# Patient Record
Sex: MALE | Race: WHITE | NOT HISPANIC OR LATINO | Employment: OTHER | URBAN - METROPOLITAN AREA
[De-identification: names, ages, dates, MRNs, and addresses within clinical notes are randomized per-mention and may not be internally consistent; named-entity substitution may affect disease eponyms.]

---

## 2018-02-18 ENCOUNTER — APPOINTMENT (OUTPATIENT)
Dept: CT IMAGING | Facility: CLINIC | Age: 63
End: 2018-02-18
Attending: PHYSICIAN ASSISTANT
Payer: COMMERCIAL

## 2018-02-18 ENCOUNTER — HOSPITAL ENCOUNTER (EMERGENCY)
Facility: CLINIC | Age: 63
Discharge: HOME | End: 2018-02-18
Attending: PHYSICIAN ASSISTANT | Admitting: PHYSICIAN ASSISTANT
Payer: COMMERCIAL

## 2018-02-18 VITALS
DIASTOLIC BLOOD PRESSURE: 89 MMHG | TEMPERATURE: 99.7 F | OXYGEN SATURATION: 97 % | SYSTOLIC BLOOD PRESSURE: 159 MMHG | RESPIRATION RATE: 16 BRPM

## 2018-02-18 DIAGNOSIS — K11.21 PAROTITIS, ACUTE: ICD-10-CM

## 2018-02-18 PROCEDURE — 25000128 H RX IP 250 OP 636: Performed by: PHYSICIAN ASSISTANT

## 2018-02-18 PROCEDURE — 25000125 ZZHC RX 250: Performed by: PHYSICIAN ASSISTANT

## 2018-02-18 PROCEDURE — 70491 CT SOFT TISSUE NECK W/DYE: CPT

## 2018-02-18 PROCEDURE — G0463 HOSPITAL OUTPT CLINIC VISIT: HCPCS | Mod: 25

## 2018-02-18 PROCEDURE — 99214 OFFICE O/P EST MOD 30 MIN: CPT | Performed by: PHYSICIAN ASSISTANT

## 2018-02-18 RX ORDER — IOPAMIDOL 755 MG/ML
80 INJECTION, SOLUTION INTRAVASCULAR ONCE
Status: COMPLETED | OUTPATIENT
Start: 2018-02-18 | End: 2018-02-18

## 2018-02-18 RX ADMIN — IOPAMIDOL 80 ML: 755 INJECTION, SOLUTION INTRAVENOUS at 14:18

## 2018-02-18 RX ADMIN — SODIUM CHLORIDE 80 ML: 9 INJECTION, SOLUTION INTRAVENOUS at 14:18

## 2018-02-18 NOTE — ED AVS SNAPSHOT
" [facility] Emergency Department    [address] [place] [address]    Phone:  [phone]    Fax:  [phone]                                       Fadi Fernando   MRN: [id number]    Department:  [facility] Emergency Department   Date of Visit:  2/18/2018           Patient Information     Date Of Birth          1955        Your diagnoses for this visit were:     Parotitis, acute        You were seen by Iman Manzo PA-C.      Follow-up Information     Follow up with Sp Gudino DO In 3 days.    Specialty:  Family Practice    Why:  if no improvement or sooner if new or worsening symptoms     Contact information:    [facility]  [address] [address]  [phone]          Discharge Instructions         Salivary Gland Infection  Salivary glands make saliva. Saliva is mostly water. It also has minerals and proteins that help break down food and keep the mouth and teeth healthy. There are three pairs of salivary glands:    Parotid glands (in front of the ear)    Submandibular glands (below the jaw)    Sublingual glands (below the tongue)  A salivary gland can become infected by bacteria (germs). Things that make this more likely include dehydration and taking medicines that affect saliva flow. Infection is also more likely when the duct (channel) that carries saliva from the gland to the mouth is blocked. It may be blocked by a salivary gland \"stone.\" This is a collection of minerals that forms in the salivary gland.  Signs of infection include fever, severe pain in the gland, and redness and swelling over the gland. It may hurt to open the mouth. Symptoms may be worse when the flow of saliva is stimulated, such as by the smell of food.   Antibiotics are used to treat the infection. Drainage of the infection with a simple surgical procedure is sometimes needed. It a salivary gland stone is present, a procedure may be done to remove " it.  Home Care:    Take antibiotics as directed until they are finished. Do this even if you start to feel better after only a few days.    Unless another medicine was prescribed, take over-the-counter medicines, such as acetaminophen or ibuprofen, to help relieve pain.    Moist heat can also help relieve swelling and pain. Wet a cloth with warm water and put it over the affected gland for 10-15 minutes several times a day.    Gently massage the gland a few times a day.     Suck on lemon or other tart hard candies to encourage flow of saliva.  To help prevent blockages and infections:    Drink 6-8 glasses of fluid per day (such as water, tea, and clear soup) to keep well hydrated.    If you smoke, ask your healthcare provider for help to quit. Smoking makes salivary gland stones more likely.    Maintain good dental hygiene. Brush and floss your teeth daily. See your dentist for regular cleanings.  Follow-up care  Follow up with your healthcare provider or as advised.   When to seek medical advice  Call your healthcare provider if any of the following occur:    Fever over 100.4 F (38 C) after two days of taking antibiotics    Symptoms get worse or don't improve within a few days    Trouble breathing or swallowing  Date Last Reviewed: 5/4/2015 2000-2017 [employer]. [address] [place] [address], [place] [address]. All rights reserved. This information is not intended as a substitute for professional medical care. Always follow your healthcare professional's instructions.          24 Hour Appointment Hotline       To make an appointment at any [facility] clinic, call [phone] ([phone]). If you don't have a family doctor or clinic, we will help you find one. [facility] clinics are conveniently located to serve the needs of you and your family.             Review of your medicines      START taking        Dose / Directions Last dose taken    amoxicillin-clavulanate 875-125 MG per tablet   Commonly  known as:  AUGMENTIN   Dose:  1 tablet   Quantity:  20 tablet        Take 1 tablet by mouth 2 times daily for 10 days   Refills:  0          Our records show that you are taking the medicines listed below. If these are incorrect, please call your family doctor or clinic.        Dose / Directions Last dose taken    aspirin 325 MG EC tablet        1 TABLET ORALLY DAILY   Refills:  0        CHLORTHALIDONE PO   Dose:  25 mg        Take 25 mg by mouth daily   Refills:  0        IMDUR PO   Dose:  30 mg        Take 30 mg by mouth daily   Refills:  0        LIPITOR PO   Dose:  40 mg        Take 40 mg by mouth daily   Refills:  0        LOSARTAN POTASSIUM PO   Dose:  50 mg   Indication:  High Blood Pressure Disorder        Take 50 mg by mouth 2 times daily   Refills:  0        METOPROLOL TARTRATE PO   Dose:  100 mg   Indication:  High Blood Pressure Disorder        Take 100 mg by mouth 2 times daily   Refills:  0        NORVASC PO   Dose:  5 mg        Take 5 mg by mouth 2 times daily   Refills:  0        OMEPRAZOLE PO   Dose:  20 mg        Take 20 mg by mouth daily   Refills:  0        PLAVIX PO   Dose:  75 mg        Take 75 mg by mouth daily   Refills:  0                Prescriptions were sent or printed at these locations (1 Prescription)                   [facility] [address] AT [facility] [address] [facility]   [address] [facility] [address]    Telephone:  [phone]   Fax:  [phone]   Hours:                  E-Prescribed (1 of 1)         amoxicillin-clavulanate (AUGMENTIN) 875-125 MG per tablet                Procedures and tests performed during your visit     Soft tissue neck CT w contrast      Orders Needing Specimen Collection     None      Pending Results     No orders found from 2/16/2018 to 2/19/2018.            Pending Culture Results     No orders found from 2/16/2018 to 2/19/2018.            Pending Results Instructions     If you had any lab results  that were not finalized at the time of your Discharge, you can call the ED Lab Result RN at [phone]. You will be contacted by this team for any positive Lab results or changes in treatment. The nurses are available 7 days a week from 10A to 6:30P.  You can leave a message 24 hours per day and they will return your call.        Test Results From Your Hospital Stay        2/18/2018  2:50 PM      Narrative     CT SCAN OF THE NECK WITH CONTRAST  2/18/2018 2:32 PM     HISTORY: Left-sided neck swelling, denies fevers, increased pain left  side, marked area of swelling with vitamin E.     TECHNIQUE: Axial images and coronal reformations. Radiation dose for  this scan was reduced using automated exposure control, adjustment of  the mA and/or kV according to patient size, or iterative  reconstruction technique. 80 mL isovue 370 IV.    COMPARISON: None.    FINDINGS:  Visualized sinuses, nasopharynx and orbits: Normal.      Tongue, oral cavity and oropharynx:  Normal.      Hypopharynx: Normal.      Larynx and trachea: Normal.      Thyroid: Normal.    Submandibular glands: Normal.      Parotid glands: There is swelling of the left parotid gland  superficial lobe especially inferiorly with adjacent strand-like  density consistent with peritonitis. This is best seen on the coronal  images, with the left gland having higher attenuation than the right.  Normal right parotid gland. No evidence of abscess. The tail of the  left parotid gland corresponds with the vitamin E capsule marker.  There is thickening of the adjacent fascia, an additional sign of  inflammation.      Lymph nodes: Normal.      Vasculature: Normal.      Lungs and upper mediastinum: Normal.      Bones: Mild torus mandibularis and torus maxillaris, normal variants.  Fluid in a few of the inferior left mastoid air cells.        Impression     IMPRESSION: Left parotitis involving the superficial lobe of the left  parotid gland and more the tail of the gland than  "superiorly. No  evidence of abscess.    ARCELIA JETER MD                Thank you for choosing [facility]       Thank you for choosing [facility] for your care. Our goal is always to provide you with excellent care. Hearing back from our patients is one way we can continue to improve our services. Please take a few minutes to complete the written survey that you may receive in the mail after you visit with us. Thank you!        [facility] Information     [facility] lets you send messages to your doctor, view your test results, renew your prescriptions, schedule appointments and more. To sign up, go to [email or web address] . Click on \"Log in\" on the left side of the screen, which will take you to the Welcome page. Then click on \"Sign up Now\" on the right side of the page.     You will be asked to enter the access code listed below, as well as some personal information. Please follow the directions to create your username and password.     Your access code is: [email or web address]  Expires: 2018  3:00 PM     Your access code will  in 90 days. If you need help or a new code, please call your [facility] clinic or [phone].        Care EveryWhere ID     This is your Care EveryWhere ID. This could be used by other organizations to access your [facility] medical records  [id number]        Equal Access to Services     ROMELIA WANG : Hadii saroj Roberts, waaxda luqadaha, qaybta kaalmada adeegyada, светлана hdz . So [facility] [phone].    ATENCIÓN: Si habla español, tiene a klein disposición servicios gratuitos de asistencia lingüística. Llame al [phone].    We comply with applicable federal civil rights laws and [place] laws. We do not discriminate on the basis of race, color, national origin, age, disability, sex, sexual orientation, or gender identity.            After Visit Summary       This is your record. Keep this with you and show to your community pharmacist(s) and doctor(s) " at your next visit.

## 2018-02-18 NOTE — DISCHARGE INSTRUCTIONS
"  Salivary Gland Infection  Salivary glands make saliva. Saliva is mostly water. It also has minerals and proteins that help break down food and keep the mouth and teeth healthy. There are three pairs of salivary glands:    Parotid glands (in front of the ear)    Submandibular glands (below the jaw)    Sublingual glands (below the tongue)  A salivary gland can become infected by bacteria (germs). Things that make this more likely include dehydration and taking medicines that affect saliva flow. Infection is also more likely when the duct (channel) that carries saliva from the gland to the mouth is blocked. It may be blocked by a salivary gland \"stone.\" This is a collection of minerals that forms in the salivary gland.  Signs of infection include fever, severe pain in the gland, and redness and swelling over the gland. It may hurt to open the mouth. Symptoms may be worse when the flow of saliva is stimulated, such as by the smell of food.   Antibiotics are used to treat the infection. Drainage of the infection with a simple surgical procedure is sometimes needed. It a salivary gland stone is present, a procedure may be done to remove it.  Home Care:    Take antibiotics as directed until they are finished. Do this even if you start to feel better after only a few days.    Unless another medicine was prescribed, take over-the-counter medicines, such as acetaminophen or ibuprofen, to help relieve pain.    Moist heat can also help relieve swelling and pain. Wet a cloth with warm water and put it over the affected gland for 10-15 minutes several times a day.    Gently massage the gland a few times a day.     Suck on lemon or other tart hard candies to encourage flow of saliva.  To help prevent blockages and infections:    Drink 6-8 glasses of fluid per day (such as water, tea, and clear soup) to keep well hydrated.    If you smoke, ask your healthcare provider for help to quit. Smoking makes salivary gland stones more " likely.    Maintain good dental hygiene. Brush and floss your teeth daily. See your dentist for regular cleanings.  Follow-up care  Follow up with your healthcare provider or as advised.   When to seek medical advice  Call your healthcare provider if any of the following occur:    Fever over 100.4 F (38 C) after two days of taking antibiotics    Symptoms get worse or don't improve within a few days    Trouble breathing or swallowing  Date Last Reviewed: 5/4/2015 2000-2017 [employer]. [address] [place] [address]. All rights reserved. This information is not intended as a substitute for professional medical care. Always follow your healthcare professional's instructions.

## 2018-02-18 NOTE — ED AVS SNAPSHOT
[facility] Emergency Department    [address] [address]    Phone:  [phone]    Fax:  [phone]                                       Fadi Fernando   MRN: [id number]    Department:  [facility] Emergency Department   Date of Visit:  2/18/2018           After Visit Summary Signature Page     I have received my discharge instructions, and my questions have been answered. I have discussed any challenges I see with this plan with the nurse or doctor.    ..........................................................................................................................................  Patient/Patient Representative Signature      ..........................................................................................................................................  Patient Representative Print Name and Relationship to Patient    ..................................................               ................................................  Date                                            Time    ..........................................................................................................................................  Reviewed by Signature/Title    ...................................................              ..............................................  Date                                                            Time

## 2018-02-27 NOTE — ED PROVIDER NOTES
History     Chief Complaint   Patient presents with     Facial Swelling     Pt started having swelling in L jaw.  Has a bad L lower molar, broken, feels like swelling is from that.      HPI  Fadi Fernando is a 63 year old male who presents to the urgent care with concern over left-sided facial/neck swelling which is been present for the last 24 hours.  Patient states he has an ongoing history of known dental decay and believes that swelling may be related to his tooth.  He denies any new onset dental pain at this time however.  He has not had any fever, chills, myalgias, nasal congestion, sore throat, cough, dyspnea, wheezing.  His pain does not wax and wane with eating or drinking.    Problem List:    There are no active problems to display for this patient.       Past Medical History:    Past Medical History:   Diagnosis Date     Hypertension        Past Surgical History:    Past Surgical History:   Procedure Laterality Date     APPENDECTOMY       HERNIA REPAIR         Family History:    Family History   Problem Relation Age of Onset     MENTAL ILLNESS Mother      MENTAL ILLNESS Maternal Grandmother      Substance Abuse Son      Suicide Son      Substance Abuse Daughter      Suicide Daughter      Substance Abuse Other        Social History:  Marital Status:   [4]  Social History   Substance Use Topics     Smoking status: Former Smoker     Smokeless tobacco: Not on file     Alcohol use Yes      Comment: Sober since 1/28/16.      Medications:      amoxicillin-clavulanate (AUGMENTIN) 875-125 MG per tablet   CHLORTHALIDONE PO   Isosorbide Mononitrate (IMDUR PO)   AmLODIPine Besylate (NORVASC PO)   Clopidogrel Bisulfate (PLAVIX PO)   Atorvastatin Calcium (LIPITOR PO)   LOSARTAN POTASSIUM PO   METOPROLOL TARTRATE PO   OMEPRAZOLE PO   ASPIRIN 325 MG OR TBEC     Review of Systems  CONSTITUTIONAL:NEGATIVE for fever, chills, change in weight  INTEGUMENTARY/SKIN: NEGATIVE for worrisome rashes, moles or  lesions  EYES: NEGATIVE for vision changes or irritation  ENT/MOUTH: POSITIVE for left sided facial/jaw swelling and NEGATIVE for nasal congestion, sore throat, ear pain  RESP:NEGATIVE for significant cough or SOB  GI: NEGATIVE for abdominal pain, diarrhea, nausea and vomiting  Physical Exam   BP: 159/89  Heart Rate: 77  Temp: 99.7  F (37.6  C)  Resp: 16  SpO2: 97 %  Physical Exam   Constitutional: He appears well-developed and well-nourished. No distress.   HENT:   Head: Normocephalic and atraumatic.       Fractured tooth #21 without focal tenderness.   Neck: Normal range of motion. Neck supple.   Cardiovascular: Normal rate, regular rhythm and normal heart sounds.  Exam reveals no gallop and no friction rub.    No murmur heard.  Pulmonary/Chest: Effort normal and breath sounds normal. No respiratory distress. He has no wheezes. He has no rales.   Skin: Skin is warm and dry. No rash noted. No erythema.     ED Course     ED Course     Procedures        Critical Care time:  none            Labs Ordered and Resulted from Time of ED Arrival Up to the Time of Departure from the ED - No data to display    Results for orders placed or performed during the hospital encounter of 02/18/18   Soft tissue neck CT w contrast    Narrative    CT SCAN OF THE NECK WITH CONTRAST  2/18/2018 2:32 PM     HISTORY: Left-sided neck swelling, denies fevers, increased pain left  side, marked area of swelling with vitamin E.     TECHNIQUE: Axial images and coronal reformations. Radiation dose for  this scan was reduced using automated exposure control, adjustment of  the mA and/or kV according to patient size, or iterative  reconstruction technique. 80 mL isovue 370 IV.    COMPARISON: None.    FINDINGS:  Visualized sinuses, nasopharynx and orbits: Normal.      Tongue, oral cavity and oropharynx:  Normal.      Hypopharynx: Normal.      Larynx and trachea: Normal.      Thyroid: Normal.    Submandibular glands: Normal.      Parotid glands: There  is swelling of the left parotid gland  superficial lobe especially inferiorly with adjacent strand-like  density consistent with peritonitis. This is best seen on the coronal  images, with the left gland having higher attenuation than the right.  Normal right parotid gland. No evidence of abscess. The tail of the  left parotid gland corresponds with the vitamin E capsule marker.  There is thickening of the adjacent fascia, an additional sign of  inflammation.      Lymph nodes: Normal.      Vasculature: Normal.      Lungs and upper mediastinum: Normal.      Bones: Mild torus mandibularis and torus maxillaris, normal variants.  Fluid in a few of the inferior left mastoid air cells.      Impression    IMPRESSION: Left parotitis involving the superficial lobe of the left  parotid gland and more the tail of the gland than superiorly. No  evidence of abscess.    ARCELIA JETER MD     Assessments & Plan (with Medical Decision Making)     I have reviewed the nursing notes.    I have reviewed the findings, diagnosis, plan and need for follow up with the patient.       Discharge Medication List as of 2/18/2018  3:00 PM      START taking these medications    Details   amoxicillin-clavulanate (AUGMENTIN) 875-125 MG per tablet Take 1 tablet by mouth 2 times daily for 10 days, Disp-20 tablet, R-0, E-Prescribe           Final diagnoses:   Parotitis, acute     22-year-old male presents to urgent care with concern over 24 history of left-sided facial/jaw swelling.  He was noted to have hypertension on arrival, remainder vital signs within normal limits.  Physical exam findings as described above.  Better elucidate.  Swelling did elect to do the CT scan which confirmed left parotitis with abscess.  Patient was discharged in stable prescription for Augmentin.  Follow-up with primary care provider if no improvement within the next 3-5 days.  Worrisome reasons to return to the ER/UC sooner discussed.    Disclaimer: This note consists of  symbols derived from keyboarding, dictation, and/or voice recognition software. As a result, there may be errors in the script that have gone undetected.  Please consider this when interpreting information found in the chart.      2/18/2018   [facility] EMERGENCY DEPARTMENT     Iman Manzo PA-C  02/26/18 2101

## 2019-02-07 ENCOUNTER — HOSPITAL ENCOUNTER (EMERGENCY)
Facility: CLINIC | Age: 64
Discharge: HOME | End: 2019-02-07
Attending: NURSE PRACTITIONER | Admitting: NURSE PRACTITIONER
Payer: COMMERCIAL

## 2019-02-07 ENCOUNTER — APPOINTMENT (OUTPATIENT)
Dept: GENERAL RADIOLOGY | Facility: CLINIC | Age: 64
End: 2019-02-07
Attending: NURSE PRACTITIONER
Payer: COMMERCIAL

## 2019-02-07 VITALS
RESPIRATION RATE: 18 BRPM | OXYGEN SATURATION: 95 % | DIASTOLIC BLOOD PRESSURE: 90 MMHG | BODY MASS INDEX: 33.75 KG/M2 | SYSTOLIC BLOOD PRESSURE: 176 MMHG | HEART RATE: 103 BPM | TEMPERATURE: 98 F | HEIGHT: 66 IN | WEIGHT: 210 LBS

## 2019-02-07 DIAGNOSIS — R06.02 SHORTNESS OF BREATH: ICD-10-CM

## 2019-02-07 DIAGNOSIS — I25.10 CORONARY ARTERY DISEASE: ICD-10-CM

## 2019-02-07 DIAGNOSIS — R06.2 BILATERAL WHEEZING: ICD-10-CM

## 2019-02-07 DIAGNOSIS — R07.89 CHEST WALL PAIN: ICD-10-CM

## 2019-02-07 PROBLEM — C44.91 BASAL CELL CARCINOMA OF SKIN: Status: ACTIVE | Noted: 2017-06-13

## 2019-02-07 PROBLEM — K21.9 GERD (GASTROESOPHAGEAL REFLUX DISEASE): Status: ACTIVE | Noted: 2019-02-07

## 2019-02-07 PROCEDURE — 99213 OFFICE O/P EST LOW 20 MIN: CPT | Mod: Z6 | Performed by: NURSE PRACTITIONER

## 2019-02-07 PROCEDURE — 94640 AIRWAY INHALATION TREATMENT: CPT

## 2019-02-07 PROCEDURE — G0463 HOSPITAL OUTPT CLINIC VISIT: HCPCS | Mod: 25

## 2019-02-07 PROCEDURE — 25000125 ZZHC RX 250: Performed by: NURSE PRACTITIONER

## 2019-02-07 PROCEDURE — 71101 X-RAY EXAM UNILAT RIBS/CHEST: CPT | Mod: RT

## 2019-02-07 RX ORDER — INHALER, ASSIST DEVICES
1 SPACER (EA) MISCELLANEOUS ONCE
Status: DISCONTINUED | OUTPATIENT
Start: 2019-02-07 | End: 2019-02-07 | Stop reason: CLARIF

## 2019-02-07 RX ORDER — AMLODIPINE BESYLATE 10 MG/1
10 TABLET ORAL
COMMUNITY
Start: 2018-11-06 | End: 2021-09-27

## 2019-02-07 RX ORDER — METOPROLOL SUCCINATE 100 MG/1
100 TABLET, EXTENDED RELEASE ORAL DAILY
COMMUNITY
Start: 2021-09-17 | End: 2022-01-01

## 2019-02-07 RX ORDER — ISOSORBIDE MONONITRATE 30 MG/1
30 TABLET, EXTENDED RELEASE ORAL
COMMUNITY
Start: 2018-11-06 | End: 2021-09-27

## 2019-02-07 RX ORDER — LOSARTAN POTASSIUM 100 MG/1
100 TABLET ORAL
COMMUNITY
Start: 2018-11-06 | End: 2021-09-27

## 2019-02-07 RX ORDER — CHLORTHALIDONE 25 MG/1
25 TABLET ORAL
COMMUNITY
Start: 2018-11-06 | End: 2021-09-27

## 2019-02-07 RX ORDER — LIDOCAINE 50 MG/G
2 PATCH TOPICAL EVERY 24 HOURS
Qty: 60 PATCH | Refills: 0 | Status: SHIPPED | OUTPATIENT
Start: 2019-02-07 | End: 2019-03-09

## 2019-02-07 RX ORDER — IPRATROPIUM BROMIDE AND ALBUTEROL SULFATE 2.5; .5 MG/3ML; MG/3ML
3 SOLUTION RESPIRATORY (INHALATION) ONCE
Status: COMPLETED | OUTPATIENT
Start: 2019-02-07 | End: 2019-02-07

## 2019-02-07 RX ORDER — MELOXICAM 15 MG/1
15 TABLET ORAL DAILY
Qty: 15 TABLET | Refills: 0 | Status: SHIPPED | OUTPATIENT
Start: 2019-02-07 | End: 2021-09-27

## 2019-02-07 RX ORDER — NITROGLYCERIN 0.4 MG/1
0.4 TABLET SUBLINGUAL EVERY 5 MIN PRN
COMMUNITY
Start: 2018-04-25 | End: 2022-01-01

## 2019-02-07 RX ADMIN — IPRATROPIUM BROMIDE AND ALBUTEROL SULFATE 3 ML: .5; 3 SOLUTION RESPIRATORY (INHALATION) at 16:10

## 2019-02-07 ASSESSMENT — ENCOUNTER SYMPTOMS
DIAPHORESIS: 0
SHORTNESS OF BREATH: 1
SORE THROAT: 0
COUGH: 1
SINUS PAIN: 0
SPEECH DIFFICULTY: 0
DIARRHEA: 0
FEVER: 0
DYSURIA: 0
NUMBNESS: 0
CHILLS: 0
WHEEZING: 0
EYE PAIN: 0
DIFFICULTY URINATING: 0
CONFUSION: 0
ABDOMINAL PAIN: 0
VOMITING: 0
CONSTIPATION: 0
WEAKNESS: 0
WOUND: 0
NAUSEA: 0

## 2019-02-07 ASSESSMENT — MIFFLIN-ST. JEOR: SCORE: 1695.3

## 2019-02-07 NOTE — ED AVS SNAPSHOT
[facility] Emergency Department  [address] [facility] [address] [address]  Phone:  [phone]  Fax:  [phone]                                    Fadi Fernando   MRN: [id number]    Department:  [facility] Emergency Department   Date of Visit:  2/7/2019           After Visit Summary Signature Page    I have received my discharge instructions, and my questions have been answered. I have discussed any challenges I see with this plan with the nurse or doctor.    ..........................................................................................................................................  Patient/Patient Representative Signature      ..........................................................................................................................................  Patient Representative Print Name and Relationship to Patient    ..................................................               ................................................  Date                                   Time    ..........................................................................................................................................  Reviewed by Signature/Title    ...................................................              ..............................................  Date                                               Time          22EPIC Rev 08/18

## 2019-02-07 NOTE — ED PROVIDER NOTES
History     Chief Complaint   Patient presents with     Rib Pain     R sided rib pain after coughing     HPI  Fadi Fernando is a 64 year old male who presents with right sided rib pain.  Pt states he was coughing today and felt something pop and reports immediate shooting pain in right side of abdomen/lower rib area.   Pt reports pain as sharp and throbbing pain without a cough but movement or a cough elicits a sharp pain.  Pt reports pain level of 05-9/10.Pt admits to chronic shortness of breath.  PT states hx of chronic cough and admits to smoking cessation 10 years ago.  Pt denies hx of COPD.      Allergies:  Allergies   Allergen Reactions     Lisinopril      Motrin [Ibuprofen Micronized] GI Disturbance     Niacin      Problem List:    Patient Active Problem List    Diagnosis Date Noted     GERD (gastroesophageal reflux disease) 02/07/2019     Priority: Medium     Coronary artery disease      Priority: Medium     Basal cell carcinoma of skin 06/13/2017     Priority: Medium     Tourette's disorder 11/30/2016     Priority: Medium     Depression 09/21/2016     Priority: Medium     Alcohol abuse 01/31/2015     Priority: Medium        Past Medical History:    Past Medical History:   Diagnosis Date     Coronary artery disease      Hypertension        Past Surgical History:    Past Surgical History:   Procedure Laterality Date     APPENDECTOMY       HERNIA REPAIR         Family History:    Family History   Problem Relation Age of Onset     Mental Illness Mother      Mental Illness Maternal Grandmother      Substance Abuse Son      Suicide Son      Substance Abuse Daughter      Suicide Daughter      Substance Abuse Other        Social History:  Marital Status:   [4]  Social History     Tobacco Use     Smoking status: Former Smoker   Substance Use Topics     Alcohol use: Yes     Comment: Sober since 1/28/16.     Drug use: No        Medications:      amLODIPine (NORVASC) 10 MG tablet   chlorthalidone  "(HYGROTON) 25 MG tablet   fluticasone (FLOVENT DISKUS) 50 MCG/BLIST inhaler   Ipratropium-Albuterol (COMBIVENT RESPIMAT)  MCG/ACT inhaler   isosorbide mononitrate (IMDUR) 30 MG 24 hr tablet   lidocaine (LIDODERM) 5 % patch   losartan (COZAAR) 100 MG tablet   meloxicam (MOBIC) 15 MG tablet   metoprolol succinate ER (TOPROL-XL) 100 MG 24 hr tablet   nitroGLYcerin (NITROSTAT) 0.4 MG sublingual tablet   omeprazole (PRILOSEC) 20 MG DR capsule     Review of Systems   Constitutional: Negative for chills, diaphoresis and fever.   HENT: Negative for ear pain, sinus pain and sore throat.    Eyes: Negative for pain and visual disturbance.   Respiratory: Positive for cough (chronic) and shortness of breath (chronic). Negative for wheezing.    Cardiovascular: Negative for chest pain (chest wall/rib pain, anterior right lower ribs ).   Gastrointestinal: Negative for abdominal pain, constipation, diarrhea, nausea and vomiting.   Genitourinary: Negative for difficulty urinating and dysuria.   Skin: Negative for rash and wound.   Neurological: Negative for speech difficulty, weakness and numbness.   Psychiatric/Behavioral: Negative for confusion and self-injury.   All other systems reviewed and are negative.      Physical Exam   BP: 176/90  Pulse: 103  Temp: 98  F (36.7  C)  Resp: 18  Height: 167.6 cm (5' 6\")  Weight: 95.3 kg (210 lb)  SpO2: 95 %      Physical Exam   Constitutional: He is oriented to person, place, and time. He appears well-developed and well-nourished. He is cooperative. No distress.   HENT:   Head: Normocephalic and atraumatic.   Right Ear: Hearing and external ear normal.   Left Ear: Hearing and external ear normal.   Nose: Nose normal.   Mouth/Throat: Oropharynx is clear and moist.   Eyes: Conjunctivae are normal. Right eye exhibits no discharge. Left eye exhibits no discharge. No scleral icterus.   Neck: Normal range of motion. Neck supple.   Cardiovascular: Normal rate, regular rhythm, normal heart " sounds and intact distal pulses. Exam reveals no gallop and no friction rub.   No murmur heard.  Pulmonary/Chest: Effort normal. No accessory muscle usage or stridor. No tachypnea. No respiratory distress. He has wheezes (moderate wheezing heard through out all lobes). He has no rhonchi. He has no rales. He exhibits tenderness and bony tenderness (anterior mid clavicular line to mid-axillary line lower rib region without swelling, crepitus, bruising, deformity, accessory muscle use). He exhibits no crepitus, no edema, no deformity and no swelling.       Musculoskeletal: He exhibits no edema or tenderness.   Lymphadenopathy:     He has no cervical adenopathy.   Neurological: He is alert and oriented to person, place, and time.   Skin: Skin is warm. Capillary refill takes less than 2 seconds. No rash noted. He is not diaphoretic.   Psychiatric: He has a normal mood and affect.   Nursing note and vitals reviewed.      ED Course        Procedures    Results for orders placed or performed during the hospital encounter of 02/07/19 (from the past 24 hour(s))   Ribs XR, unilat 3 views + PA chest, right    Narrative    RIBS AND CHEST RIGHT THREE VIEWS    2/7/2019 4:07 PM     HISTORY: Cough and onset of right rib pain    COMPARISON: 4/14/2016.      Impression    IMPRESSION: No pneumothorax. No acute airspace disease. Normal cardiac  silhouette. No acute fracture is visualized. There is a stable opacity  projected over the lower mediastinum that could represent a moderate  hiatal hernia. However, it is nonspecific. A CT could provide  clarification.    CAESAR HANSON MD       Medications   ipratropium - albuterol 0.5 mg/2.5 mg/3 mL (DUONEB) neb solution 3 mL (3 mLs Nebulization Given 2/7/19 1610)       Assessments & Plan (with Medical Decision Making)     I have reviewed the nursing notes.    I have reviewed the findings, diagnosis, plan and need for follow up with the patient.  Fadi  Kassie is a 64 year old male who presents  with right sided rib pain.  Pt states he was coughing today and felt something pop and reports immediate shooting pain in right side of abdomen/lower rib area.   Pt reports pain as sharp and throbbing pain without a cough but movement or a cough elicits a sharp pain.  Pt reports pain level of 05-9/10.Pt admits to chronic shortness of breath.  PT states hx of chronic cough and admits to smoking cessation 10 years ago.  Pt denies hx of COPD.    Exam as noted above.  Due to acuteness of pain chest x-ray obtained to rule out rib fracture or pneumothorax.  Also ordered DuoNeb to diffuse wheezing noted and reported shortness of breath by patient.  DuoNeb results and moderate improvement and decreased cough.  Did discuss findings with patient that no pneumothorax is noted; no rib fractures noted.  Asked patient if he has a known hiatal hernia and he admits he has a known hiatal hernia.  Discussed resolution of his chest wall pain and patient states that ibuprofen causes GI upset and therefore will do trial of meloxicam that he can take every evening after a meal for pain.  Will trial lidocaine patches topically that he leaves on for 12 hours and then removed for 12 hours.  In regards to his shortness of breath and chronic wheezing suspect COPD.  Will initiate patient on Flovent and Combivent and recommend follow-up with primary care in 10 days for reevaluation.  Patient verbalizes understanding regarding all of the above and was discharged in stable condition.       Medication List      Started    fluticasone 50 MCG/BLIST inhaler  Commonly known as:  FLOVENT DISKUS  2 puffs, Inhalation, EVERY 12 HOURS     Ipratropium-Albuterol  MCG/ACT inhaler  Commonly known as:  COMBIVENT RESPIMAT  1 puff, Inhalation, 4 TIMES DAILY     lidocaine 5 % patch  Commonly known as:  LIDODERM  2 patches, Transdermal, EVERY 24 HOURS     meloxicam 15 MG tablet  Commonly known as:  MOBIC  15 mg, Oral, DAILY            Final diagnoses:   Chest  wall pain   Bilateral wheezing   Shortness of breath       2/7/2019   [facility] EMERGENCY DEPARTMENT     Shantelle Martin, APRN CNP  02/07/19 1619

## 2021-01-01 ENCOUNTER — LAB REQUISITION (OUTPATIENT)
Dept: LAB | Facility: CLINIC | Age: 66
End: 2021-01-01
Payer: MEDICAID

## 2021-01-01 ENCOUNTER — LAB REQUISITION (OUTPATIENT)
Dept: LAB | Facility: CLINIC | Age: 66
End: 2021-01-01
Payer: MEDICARE

## 2021-01-01 DIAGNOSIS — D64.9 ANEMIA, UNSPECIFIED: ICD-10-CM

## 2021-01-01 DIAGNOSIS — D52.9 FOLATE DEFICIENCY ANEMIA, UNSPECIFIED: ICD-10-CM

## 2021-01-01 DIAGNOSIS — I50.9 HEART FAILURE, UNSPECIFIED (H): ICD-10-CM

## 2021-01-01 LAB
ALBUMIN SERPL-MCNC: 2.6 G/DL (ref 3.5–5)
ALBUMIN SERPL-MCNC: 2.7 G/DL (ref 3.5–5)
ALP SERPL-CCNC: 111 U/L (ref 45–120)
ALP SERPL-CCNC: 138 U/L (ref 45–120)
ALT SERPL W P-5'-P-CCNC: 27 U/L (ref 0–45)
ALT SERPL W P-5'-P-CCNC: 32 U/L (ref 0–45)
ANION GAP SERPL CALCULATED.3IONS-SCNC: 11 MMOL/L (ref 5–18)
AST SERPL W P-5'-P-CCNC: 47 U/L (ref 0–40)
AST SERPL W P-5'-P-CCNC: 54 U/L (ref 0–40)
BILIRUB DIRECT SERPL-MCNC: 0.4 MG/DL
BILIRUB DIRECT SERPL-MCNC: 0.5 MG/DL
BILIRUB SERPL-MCNC: 1 MG/DL (ref 0–1)
BILIRUB SERPL-MCNC: 1.3 MG/DL (ref 0–1)
BUN SERPL-MCNC: 19 MG/DL (ref 8–22)
CALCIUM SERPL-MCNC: 9 MG/DL (ref 8.5–10.5)
CHLORIDE BLD-SCNC: 97 MMOL/L (ref 98–107)
CO2 SERPL-SCNC: 26 MMOL/L (ref 22–31)
CREAT SERPL-MCNC: 1.32 MG/DL (ref 0.7–1.3)
ERYTHROCYTE [DISTWIDTH] IN BLOOD BY AUTOMATED COUNT: 15.3 % (ref 10–15)
FERRITIN SERPL-MCNC: 81 NG/ML (ref 27–300)
FOLATE SERPL-MCNC: 2.2 NG/ML
GFR SERPL CREATININE-BSD FRML MDRD: 57 ML/MIN/1.73M2
GLUCOSE BLD-MCNC: 119 MG/DL (ref 70–125)
HCT VFR BLD AUTO: 28.3 % (ref 40–53)
HGB BLD-MCNC: 9 G/DL (ref 13.3–17.7)
IRON SATN MFR SERPL: 15 % (ref 20–50)
IRON SERPL-MCNC: 50 UG/DL (ref 42–175)
MCH RBC QN AUTO: 31.5 PG (ref 26.5–33)
MCHC RBC AUTO-ENTMCNC: 31.8 G/DL (ref 31.5–36.5)
MCV RBC AUTO: 99 FL (ref 78–100)
PLATELET # BLD AUTO: 153 10E3/UL (ref 150–450)
POTASSIUM BLD-SCNC: 3.6 MMOL/L (ref 3.5–5)
PROT SERPL-MCNC: 6.8 G/DL (ref 6–8)
PROT SERPL-MCNC: 7.5 G/DL (ref 6–8)
RBC # BLD AUTO: 2.86 10E6/UL (ref 4.4–5.9)
SODIUM SERPL-SCNC: 134 MMOL/L (ref 136–145)
TIBC SERPL-MCNC: 331 UG/DL (ref 313–563)
TRANSFERRIN SERPL-MCNC: 265 MG/DL (ref 212–360)
TSH SERPL DL<=0.005 MIU/L-ACNC: 5.4 UIU/ML (ref 0.3–5)
VIT B12 SERPL-MCNC: 584 PG/ML (ref 213–816)
WBC # BLD AUTO: 10.9 10E3/UL (ref 4–11)

## 2021-01-01 PROCEDURE — 82728 ASSAY OF FERRITIN: CPT | Performed by: INTERNAL MEDICINE

## 2021-01-01 PROCEDURE — 80053 COMPREHEN METABOLIC PANEL: CPT | Performed by: INTERNAL MEDICINE

## 2021-01-01 PROCEDURE — 85018 HEMOGLOBIN: CPT | Performed by: INTERNAL MEDICINE

## 2021-01-01 PROCEDURE — 36415 COLL VENOUS BLD VENIPUNCTURE: CPT | Performed by: NURSE PRACTITIONER

## 2021-01-01 PROCEDURE — P9603 ONE-WAY ALLOW PRORATED MILES: HCPCS | Performed by: NURSE PRACTITIONER

## 2021-01-01 PROCEDURE — 36415 COLL VENOUS BLD VENIPUNCTURE: CPT | Performed by: INTERNAL MEDICINE

## 2021-01-01 PROCEDURE — 82746 ASSAY OF FOLIC ACID SERUM: CPT | Performed by: INTERNAL MEDICINE

## 2021-01-01 PROCEDURE — 80076 HEPATIC FUNCTION PANEL: CPT | Performed by: NURSE PRACTITIONER

## 2021-01-01 PROCEDURE — 82248 BILIRUBIN DIRECT: CPT | Performed by: INTERNAL MEDICINE

## 2021-01-01 PROCEDURE — 84443 ASSAY THYROID STIM HORMONE: CPT | Performed by: INTERNAL MEDICINE

## 2021-01-01 PROCEDURE — 82607 VITAMIN B-12: CPT | Performed by: INTERNAL MEDICINE

## 2021-01-01 PROCEDURE — 84466 ASSAY OF TRANSFERRIN: CPT | Performed by: INTERNAL MEDICINE

## 2021-05-26 ENCOUNTER — RECORDS - HEALTHEAST (OUTPATIENT)
Dept: ADMINISTRATIVE | Facility: CLINIC | Age: 66
End: 2021-05-26

## 2021-05-30 ENCOUNTER — RECORDS - HEALTHEAST (OUTPATIENT)
Dept: ADMINISTRATIVE | Facility: CLINIC | Age: 66
End: 2021-05-30

## 2021-09-27 ENCOUNTER — APPOINTMENT (OUTPATIENT)
Dept: CT IMAGING | Facility: CLINIC | Age: 66
End: 2021-09-27
Attending: EMERGENCY MEDICINE
Payer: MEDICAID

## 2021-09-27 ENCOUNTER — HOSPITAL ENCOUNTER (EMERGENCY)
Facility: CLINIC | Age: 66
Discharge: ANOTHER HOSPITAL | End: 2021-09-27
Attending: EMERGENCY MEDICINE | Admitting: EMERGENCY MEDICINE
Payer: MEDICAID

## 2021-09-27 VITALS
SYSTOLIC BLOOD PRESSURE: 135 MMHG | TEMPERATURE: 96.5 F | RESPIRATION RATE: 14 BRPM | HEIGHT: 66 IN | HEART RATE: 67 BPM | BODY MASS INDEX: 33.75 KG/M2 | WEIGHT: 210 LBS | DIASTOLIC BLOOD PRESSURE: 69 MMHG | OXYGEN SATURATION: 91 %

## 2021-09-27 DIAGNOSIS — N50.89 SCROTAL SWELLING: ICD-10-CM

## 2021-09-27 DIAGNOSIS — R93.89 ABNORMAL CT SCAN: ICD-10-CM

## 2021-09-27 DIAGNOSIS — E87.1 HYPONATREMIA: ICD-10-CM

## 2021-09-27 DIAGNOSIS — R60.1 ANASARCA: ICD-10-CM

## 2021-09-27 DIAGNOSIS — Z87.09 HISTORY OF COPD: ICD-10-CM

## 2021-09-27 LAB
ALBUMIN SERPL-MCNC: 2.4 G/DL (ref 3.4–5)
ALBUMIN UR-MCNC: 30 MG/DL
ALP SERPL-CCNC: 136 U/L (ref 40–150)
ALT SERPL W P-5'-P-CCNC: 25 U/L (ref 0–70)
ANION GAP SERPL CALCULATED.3IONS-SCNC: 5 MMOL/L (ref 3–14)
APPEARANCE UR: CLEAR
AST SERPL W P-5'-P-CCNC: 75 U/L (ref 0–45)
BACTERIA #/AREA URNS HPF: ABNORMAL /HPF
BASOPHILS # BLD AUTO: 0.1 10E3/UL (ref 0–0.2)
BASOPHILS NFR BLD AUTO: 1 %
BILIRUB SERPL-MCNC: 1.4 MG/DL (ref 0.2–1.3)
BILIRUB UR QL STRIP: NEGATIVE
BUN SERPL-MCNC: 10 MG/DL (ref 7–30)
CALCIUM SERPL-MCNC: 8.3 MG/DL (ref 8.5–10.1)
CHLORIDE BLD-SCNC: 91 MMOL/L (ref 94–109)
CO2 SERPL-SCNC: 28 MMOL/L (ref 20–32)
COLOR UR AUTO: YELLOW
CREAT SERPL-MCNC: 0.92 MG/DL (ref 0.66–1.25)
CREAT SERPL-MCNC: 1.01 MG/DL (ref 0.66–1.25)
CRP SERPL-MCNC: 19.4 MG/L (ref 0–8)
EOSINOPHIL # BLD AUTO: 0.3 10E3/UL (ref 0–0.7)
EOSINOPHIL NFR BLD AUTO: 2 %
ERYTHROCYTE [DISTWIDTH] IN BLOOD BY AUTOMATED COUNT: 14.8 % (ref 10–15)
GFR SERPL CREATININE-BSD FRML MDRD: 78 ML/MIN/1.73M2
GFR SERPL CREATININE-BSD FRML MDRD: 88 ML/MIN/1.73M2
GLUCOSE BLD-MCNC: 120 MG/DL (ref 70–99)
GLUCOSE UR STRIP-MCNC: NEGATIVE MG/DL
HBA1C MFR BLD: 6.2 % (ref 0–5.6)
HCT VFR BLD AUTO: 33.8 % (ref 40–53)
HGB BLD-MCNC: 11.3 G/DL (ref 13.3–17.7)
HGB UR QL STRIP: ABNORMAL
HOLD SPECIMEN: NORMAL
IMM GRANULOCYTES # BLD: 0.2 10E3/UL
IMM GRANULOCYTES NFR BLD: 1 %
KETONES UR STRIP-MCNC: NEGATIVE MG/DL
LACTATE SERPL-SCNC: 1.5 MMOL/L (ref 0.7–2)
LEUKOCYTE ESTERASE UR QL STRIP: NEGATIVE
LYMPHOCYTES # BLD AUTO: 0.7 10E3/UL (ref 0.8–5.3)
LYMPHOCYTES NFR BLD AUTO: 5 %
MCH RBC QN AUTO: 32.8 PG (ref 26.5–33)
MCHC RBC AUTO-ENTMCNC: 33.4 G/DL (ref 31.5–36.5)
MCV RBC AUTO: 98 FL (ref 78–100)
MONOCYTES # BLD AUTO: 1.1 10E3/UL (ref 0–1.3)
MONOCYTES NFR BLD AUTO: 8 %
NEUTROPHILS # BLD AUTO: 12.2 10E3/UL (ref 1.6–8.3)
NEUTROPHILS NFR BLD AUTO: 83 %
NITRATE UR QL: NEGATIVE
NRBC # BLD AUTO: 0 10E3/UL
NRBC BLD AUTO-RTO: 0 /100
OSMOLALITY SERPL: 254 MMOL/KG (ref 280–301)
PH UR STRIP: 5 [PH] (ref 5–7)
PLATELET # BLD AUTO: 197 10E3/UL (ref 150–450)
POTASSIUM BLD-SCNC: 4.4 MMOL/L (ref 3.4–5.3)
PROT SERPL-MCNC: 7.7 G/DL (ref 6.8–8.8)
RBC # BLD AUTO: 3.44 10E6/UL (ref 4.4–5.9)
RBC URINE: 68 /HPF
SARS-COV-2 RNA RESP QL NAA+PROBE: NEGATIVE
SODIUM SERPL-SCNC: 124 MMOL/L (ref 133–144)
SODIUM UR-SCNC: 47 MMOL/L
SP GR UR STRIP: 1.01 (ref 1–1.03)
SQUAMOUS EPITHELIAL: 1 /HPF
UROBILINOGEN UR STRIP-MCNC: NORMAL MG/DL
WBC # BLD AUTO: 14.6 10E3/UL (ref 4–11)
WBC URINE: 0 /HPF

## 2021-09-27 PROCEDURE — 96376 TX/PRO/DX INJ SAME DRUG ADON: CPT | Mod: 59 | Performed by: EMERGENCY MEDICINE

## 2021-09-27 PROCEDURE — 82565 ASSAY OF CREATININE: CPT | Performed by: FAMILY MEDICINE

## 2021-09-27 PROCEDURE — 80053 COMPREHEN METABOLIC PANEL: CPT | Performed by: EMERGENCY MEDICINE

## 2021-09-27 PROCEDURE — 81003 URINALYSIS AUTO W/O SCOPE: CPT | Performed by: EMERGENCY MEDICINE

## 2021-09-27 PROCEDURE — 250N000011 HC RX IP 250 OP 636: Performed by: EMERGENCY MEDICINE

## 2021-09-27 PROCEDURE — 96366 THER/PROPH/DIAG IV INF ADDON: CPT | Mod: 59 | Performed by: EMERGENCY MEDICINE

## 2021-09-27 PROCEDURE — 51702 INSERT TEMP BLADDER CATH: CPT | Performed by: EMERGENCY MEDICINE

## 2021-09-27 PROCEDURE — 85004 AUTOMATED DIFF WBC COUNT: CPT | Performed by: EMERGENCY MEDICINE

## 2021-09-27 PROCEDURE — 84300 ASSAY OF URINE SODIUM: CPT | Performed by: EMERGENCY MEDICINE

## 2021-09-27 PROCEDURE — 96365 THER/PROPH/DIAG IV INF INIT: CPT | Mod: 59 | Performed by: EMERGENCY MEDICINE

## 2021-09-27 PROCEDURE — 36415 COLL VENOUS BLD VENIPUNCTURE: CPT | Performed by: EMERGENCY MEDICINE

## 2021-09-27 PROCEDURE — C9803 HOPD COVID-19 SPEC COLLECT: HCPCS | Performed by: EMERGENCY MEDICINE

## 2021-09-27 PROCEDURE — 99291 CRITICAL CARE FIRST HOUR: CPT | Performed by: EMERGENCY MEDICINE

## 2021-09-27 PROCEDURE — 83036 HEMOGLOBIN GLYCOSYLATED A1C: CPT | Performed by: EMERGENCY MEDICINE

## 2021-09-27 PROCEDURE — 250N000009 HC RX 250: Performed by: EMERGENCY MEDICINE

## 2021-09-27 PROCEDURE — 96360 HYDRATION IV INFUSION INIT: CPT | Mod: 59 | Performed by: EMERGENCY MEDICINE

## 2021-09-27 PROCEDURE — 83930 ASSAY OF BLOOD OSMOLALITY: CPT | Performed by: FAMILY MEDICINE

## 2021-09-27 PROCEDURE — 83605 ASSAY OF LACTIC ACID: CPT | Performed by: EMERGENCY MEDICINE

## 2021-09-27 PROCEDURE — 96367 TX/PROPH/DG ADDL SEQ IV INF: CPT | Mod: 59 | Performed by: EMERGENCY MEDICINE

## 2021-09-27 PROCEDURE — 74177 CT ABD & PELVIS W/CONTRAST: CPT

## 2021-09-27 PROCEDURE — 258N000003 HC RX IP 258 OP 636: Performed by: EMERGENCY MEDICINE

## 2021-09-27 PROCEDURE — 87635 SARS-COV-2 COVID-19 AMP PRB: CPT | Performed by: EMERGENCY MEDICINE

## 2021-09-27 PROCEDURE — 96375 TX/PRO/DX INJ NEW DRUG ADDON: CPT | Mod: 59 | Performed by: EMERGENCY MEDICINE

## 2021-09-27 PROCEDURE — 99285 EMERGENCY DEPT VISIT HI MDM: CPT | Mod: 25 | Performed by: EMERGENCY MEDICINE

## 2021-09-27 PROCEDURE — 96361 HYDRATE IV INFUSION ADD-ON: CPT | Mod: 59 | Performed by: EMERGENCY MEDICINE

## 2021-09-27 PROCEDURE — 86140 C-REACTIVE PROTEIN: CPT | Performed by: FAMILY MEDICINE

## 2021-09-27 RX ORDER — DILTIAZEM HYDROCHLORIDE 180 MG/1
1 CAPSULE, COATED, EXTENDED RELEASE ORAL DAILY
COMMUNITY
Start: 2021-09-17 | End: 2022-01-01

## 2021-09-27 RX ORDER — ATORVASTATIN CALCIUM 40 MG/1
40 TABLET, FILM COATED ORAL AT BEDTIME
COMMUNITY
Start: 2021-02-17 | End: 2022-01-01

## 2021-09-27 RX ORDER — SODIUM CHLORIDE 9 MG/ML
1000 INJECTION, SOLUTION INTRAVENOUS CONTINUOUS
Status: DISCONTINUED | OUTPATIENT
Start: 2021-09-27 | End: 2021-09-27 | Stop reason: ALTCHOICE

## 2021-09-27 RX ORDER — IOPAMIDOL 755 MG/ML
100 INJECTION, SOLUTION INTRAVASCULAR ONCE
Status: COMPLETED | OUTPATIENT
Start: 2021-09-27 | End: 2021-09-27

## 2021-09-27 RX ORDER — HYDROMORPHONE HYDROCHLORIDE 1 MG/ML
0.5 INJECTION, SOLUTION INTRAMUSCULAR; INTRAVENOUS; SUBCUTANEOUS
Status: DISCONTINUED | OUTPATIENT
Start: 2021-09-27 | End: 2021-09-27 | Stop reason: HOSPADM

## 2021-09-27 RX ORDER — HYDROMORPHONE HYDROCHLORIDE 1 MG/ML
0.5 INJECTION, SOLUTION INTRAMUSCULAR; INTRAVENOUS; SUBCUTANEOUS EVERY 30 MIN PRN
Status: DISCONTINUED | OUTPATIENT
Start: 2021-09-27 | End: 2021-09-27

## 2021-09-27 RX ORDER — VANCOMYCIN HYDROCHLORIDE 1 G/200ML
1000 INJECTION, SOLUTION INTRAVENOUS EVERY 12 HOURS
Status: DISCONTINUED | OUTPATIENT
Start: 2021-09-27 | End: 2021-09-27 | Stop reason: HOSPADM

## 2021-09-27 RX ORDER — FENTANYL CITRATE 50 UG/ML
25 INJECTION, SOLUTION INTRAMUSCULAR; INTRAVENOUS
Status: DISCONTINUED | OUTPATIENT
Start: 2021-09-27 | End: 2021-09-27 | Stop reason: ALTCHOICE

## 2021-09-27 RX ORDER — ASPIRIN 81 MG/1
1 TABLET, COATED ORAL DAILY
COMMUNITY
Start: 2020-10-15 | End: 2022-01-01

## 2021-09-27 RX ORDER — CLINDAMYCIN PHOSPHATE 900 MG/50ML
900 INJECTION, SOLUTION INTRAVENOUS EVERY 8 HOURS
Status: DISCONTINUED | OUTPATIENT
Start: 2021-09-27 | End: 2021-09-27 | Stop reason: HOSPADM

## 2021-09-27 RX ADMIN — TAZOBACTAM SODIUM AND PIPERACILLIN SODIUM 3.38 G: 375; 3 INJECTION, SOLUTION INTRAVENOUS at 11:31

## 2021-09-27 RX ADMIN — HYDROMORPHONE HYDROCHLORIDE 0.5 MG: 1 INJECTION, SOLUTION INTRAMUSCULAR; INTRAVENOUS; SUBCUTANEOUS at 20:27

## 2021-09-27 RX ADMIN — FENTANYL CITRATE 25 MCG: 50 INJECTION, SOLUTION INTRAMUSCULAR; INTRAVENOUS at 12:14

## 2021-09-27 RX ADMIN — SODIUM CHLORIDE 500 ML: 9 INJECTION, SOLUTION INTRAVENOUS at 11:31

## 2021-09-27 RX ADMIN — TAZOBACTAM SODIUM AND PIPERACILLIN SODIUM 3.38 G: 375; 3 INJECTION, SOLUTION INTRAVENOUS at 20:57

## 2021-09-27 RX ADMIN — SODIUM CHLORIDE 66 ML: 9 INJECTION, SOLUTION INTRAVENOUS at 11:44

## 2021-09-27 RX ADMIN — IOPAMIDOL 100 ML: 755 INJECTION, SOLUTION INTRAVENOUS at 11:44

## 2021-09-27 RX ADMIN — VANCOMYCIN HYDROCHLORIDE 1000 MG: 1 INJECTION, SOLUTION INTRAVENOUS at 13:39

## 2021-09-27 RX ADMIN — HYDROMORPHONE HYDROCHLORIDE 0.5 MG: 1 INJECTION, SOLUTION INTRAMUSCULAR; INTRAVENOUS; SUBCUTANEOUS at 13:38

## 2021-09-27 RX ADMIN — CLINDAMYCIN PHOSPHATE 900 MG: 900 INJECTION, SOLUTION INTRAVENOUS at 20:33

## 2021-09-27 RX ADMIN — DEXTROSE AND SODIUM CHLORIDE: 5; 900 INJECTION, SOLUTION INTRAVENOUS at 14:37

## 2021-09-27 RX ADMIN — HYDROMORPHONE HYDROCHLORIDE 0.5 MG: 1 INJECTION, SOLUTION INTRAMUSCULAR; INTRAVENOUS; SUBCUTANEOUS at 18:20

## 2021-09-27 RX ADMIN — CLINDAMYCIN PHOSPHATE 900 MG: 900 INJECTION, SOLUTION INTRAVENOUS at 12:15

## 2021-09-27 ASSESSMENT — MIFFLIN-ST. JEOR: SCORE: 1685.3

## 2021-09-27 ASSESSMENT — ENCOUNTER SYMPTOMS
ENDOCRINE NEGATIVE: 1
RESPIRATORY NEGATIVE: 1
PSYCHIATRIC NEGATIVE: 1
CONSTITUTIONAL NEGATIVE: 1
ABDOMINAL PAIN: 1
HEMATOLOGIC/LYMPHATIC NEGATIVE: 1
EYES NEGATIVE: 1
NEUROLOGICAL NEGATIVE: 1
MUSCULOSKELETAL NEGATIVE: 1
ABDOMINAL DISTENTION: 1
CARDIOVASCULAR NEGATIVE: 1
WOUND: 1
ALLERGIC/IMMUNOLOGIC NEGATIVE: 1
DIFFICULTY URINATING: 1

## 2021-09-27 NOTE — ED PROVIDER NOTES
History     Chief Complaint   Patient presents with     Groin Pain     bilateral testicale swelling, difficulty urinary, ongoing since Friday       Urinary Retention     HPI  Fadi Fernando is a 66 year old male who by car alone from home with concern about poor urine output over the last 3 days with increasing abdominal distention and discomfort and  scrotal swelling.  Patient has multiple medical diagnoses including history of Tourette's disorder, depression, GERD, history of alcohol abuse.  Patient is prescribed multiple medications including amlodipine, chlorthalidone, Imdur, metoprolol, losartan, omeprazole.  On examination patient reports he drove himself from home because has had trouble urinating over the last 3 to 4 days.  He noted that his scrotum was swelling that he has lower abdominal discomfort.  No fever or back pain and no history of kidney stones.  Due to progressive scrotal swelling with skin changes trouble urinating he presented for further assessment and care.    Allergies:  Allergies   Allergen Reactions     Lisinopril Cough     Motrin [Ibuprofen Micronized] GI Disturbance     Niacin Other (See Comments)     Flushing         Problem List:    Patient Active Problem List    Diagnosis Date Noted     GERD (gastroesophageal reflux disease) 02/07/2019     Priority: Medium     Coronary artery disease      Priority: Medium     Basal cell carcinoma of skin 06/13/2017     Priority: Medium     Tourette's disorder 11/30/2016     Priority: Medium     Depression 09/21/2016     Priority: Medium     Alcohol abuse 01/31/2015     Priority: Medium        Past Medical History:    Past Medical History:   Diagnosis Date     Coronary artery disease      Hypertension        Past Surgical History:    Past Surgical History:   Procedure Laterality Date     APPENDECTOMY       HERNIA REPAIR         Family History:    Family History   Problem Relation Age of Onset     Mental Illness Mother      Mental Illness Maternal  "Grandmother      Substance Abuse Son      Suicide Son      Substance Abuse Daughter      Suicide Daughter      Substance Abuse Other        Social History:  Marital Status:   [4]  Social History     Tobacco Use     Smoking status: Former Smoker   Substance Use Topics     Alcohol use: Yes     Comment: Sober since 1/28/16.     Drug use: No        Medications:    ASPIRIN LOW DOSE 81 MG EC tablet  atorvastatin (LIPITOR) 40 MG tablet  diltiazem ER COATED BEADS (CARDIZEM CD/CARTIA XT) 180 MG 24 hr capsule  Ipratropium-Albuterol (COMBIVENT RESPIMAT)  MCG/ACT inhaler  metoprolol succinate ER (TOPROL-XL) 100 MG 24 hr tablet  nitroGLYcerin (NITROSTAT) 0.4 MG sublingual tablet  omeprazole (PRILOSEC) 20 MG DR capsule  TRELEGY ELLIPTA 100-62.5-25 MCG/INH oral inhaler          Review of Systems   Constitutional: Negative.    HENT: Negative.    Eyes: Negative.    Respiratory: Negative.    Cardiovascular: Negative.    Gastrointestinal: Positive for abdominal distention and abdominal pain.   Endocrine: Negative.    Genitourinary: Positive for difficulty urinating and scrotal swelling.   Musculoskeletal: Negative.    Skin: Positive for wound.   Allergic/Immunologic: Negative.    Neurological: Negative.    Hematological: Negative.    Psychiatric/Behavioral: Negative.    All other systems reviewed and are negative.      Physical Exam   BP: (!) 163/105  Pulse: 76  Temp: (!) 96.5  F (35.8  C)  Resp: 18  Height: 167.6 cm (5' 6\")  Weight: 95.3 kg (210 lb)  SpO2: 91 %      Physical Exam  Constitutional:       General: He is not in acute distress.     Appearance: He is ill-appearing.   HENT:      Head: Normocephalic and atraumatic.      Nose: Nose normal.   Eyes:      Extraocular Movements: Extraocular movements intact.      Pupils: Pupils are equal, round, and reactive to light.   Cardiovascular:      Rate and Rhythm: Normal rate and regular rhythm.   Pulmonary:      Effort: Respiratory distress present.   Musculoskeletal:    " "     General: No swelling, tenderness, deformity or signs of injury.      Cervical back: Normal range of motion and neck supple.      Right lower leg: No edema.      Left lower leg: No edema.   Skin:     Capillary Refill: Capillary refill takes less than 2 seconds.      Findings: Erythema present.   Neurological:      General: No focal deficit present.      Mental Status: He is alert and oriented to person, place, and time.   Psychiatric:         Mood and Affect: Mood normal.         Behavior: Behavior normal.         Thought Content: Thought content normal.         Judgment: Judgment normal.                   ED Course        Procedures              Critical Care time:  was 45 minutes for this patient excluding procedures.               ED medications:  Medications   clindamycin (CLEOCIN) infusion 900 mg (0 mg Intravenous Stopped 9/27/21 1326)   piperacillin-tazobactam (ZOSYN) infusion 3.375 g (0 g Intravenous Stopped 9/27/21 1214)   vancomycin (VANCOCIN) 1000 mg in dextrose 5% 200 mL PREMIX (0 mg Intravenous Stopped 9/27/21 1502)   dextrose 5% and 0.9% NaCl infusion ( Intravenous New Bag 9/27/21 1437)   HYDROmorphone (PF) (DILAUDID) injection 0.5 mg (has no administration in time range)   0.9% sodium chloride BOLUS (0 mLs Intravenous Stopped 9/27/21 1326)   iopamidol (ISOVUE-370) solution 100 mL (100 mLs Intravenous Given 9/27/21 1144)   sodium chloride 0.9 % bag 500mL for CT scan flush use (66 mLs Intravenous Given 9/27/21 1144)     ED Vitals:    Vitals:    09/27/21 0957 09/27/21 1130 09/27/21 1415 09/27/21 1430   BP: (!) 163/105 (!) 157/81 (!) 146/79 (!) 140/71   Pulse: 76 69 63 69   Resp: 18 14     Temp: (!) 96.5  F (35.8  C)      TempSrc: Temporal      SpO2: 91%  90% 92%   Weight: 95.3 kg (210 lb)      Height: 1.676 m (5' 6\")        Vitals:    09/27/21 0957 09/27/21 1130 09/27/21 1415 09/27/21 1430   BP: (!) 163/105 (!) 157/81 (!) 146/79 (!) 140/71   Pulse: 76 69 63 69   Resp: 18 14     Temp: (!) 96.5  F (35.8 " " C)      TempSrc: Temporal      SpO2: 91%  90% 92%   Weight: 95.3 kg (210 lb)      Height: 1.676 m (5' 6\")            ED labs and imaging:  Results for orders placed or performed during the hospital encounter of 09/27/21   CT Abdomen Pelvis w Contrast     Status: None    Narrative    CT ABDOMEN PELVIS WITH CONTRAST 9/27/2021 12:02 PM    CLINICAL HISTORY: Abdominal distension. Concern for Rhina's  gangrene or necrotizing soft tissue infection.  Scrotal swelling,  urinary retention abdominal distention and scrotal swelling.    TECHNIQUE: CT scan of the abdomen and pelvis was performed following  injection of IV contrast. Multiplanar reformats were obtained. Dose  reduction techniques were used.  CONTRAST: 100mL Isovue-370    COMPARISON: CT abdomen and pelvis 12/20/2006.    FINDINGS:   LOWER CHEST: Bibasilar atelectasis. Small bibasilar pleural fluid.  Acute airspace disease not entirely excluded at the lower lungs.    HEPATOBILIARY: No acute liver abnormality. No calcified gallstones.  Mild gallbladder wall thickening.    PANCREAS: Normal.    SPLEEN: Normal.    ADRENAL GLANDS: Normal.    KIDNEYS/BLADDER: No hydronephrosis. Nonobstructing stone lower left  kidney is 0.2 cm series 3 image 122. A few renal cysts bilaterally not  requiring specific imaging follow-up. Catheter decompresses the  bladder.    BOWEL: No bowel obstruction. No evidence for appendicitis. No specific  acute bowel abnormality.    PELVIC ORGANS: Bilateral prominent scrotal hydroceles. Diffuse  subcutaneous edema along the pelvis, perineal region and proximal  bilateral thighs along with skin thickening no focal fluid to suggest  abscess. No subcutaneous gas can be seen at this time. There is also  diffuse anasarca involving the abdomen and pelvis.    ADDITIONAL FINDINGS: Vascular calcifications. No suspicious lymph  node. There is small ascites.    MUSCULOSKELETAL: Diffuse spine degenerative change. No acute  abnormality.      Impression    " IMPRESSION:   1.  Bilateral prominent scrotal hydroceles. Diffuse edema and some  skin thickening at the perineum, and bilateral proximal thighs. No  subcutaneous abscess or bubbles of subcutaneous gas identified at this  time to suggest a necrotizing process. However, if there is  progressive clinical concern repeat scanning could be performed.  2.  Anasarca. Small ascites.  3.  Bibasilar atelectasis and/or acute airspace disease at the lower  lungs. Small bibasilar pleural fluid.  4.  No other acute abnormality.    CAESAR HANSON MD         SYSTEM ID:  [id number]   Comprehensive metabolic panel     Status: Abnormal   Result Value Ref Range    Sodium 124 (L) 133 - 144 mmol/L    Potassium 4.4 3.4 - 5.3 mmol/L    Chloride 91 (L) 94 - 109 mmol/L    Carbon Dioxide (CO2) 28 20 - 32 mmol/L    Anion Gap 5 3 - 14 mmol/L    Urea Nitrogen 10 7 - 30 mg/dL    Creatinine 1.01 0.66 - 1.25 mg/dL    Calcium 8.3 (L) 8.5 - 10.1 mg/dL    Glucose 120 (H) 70 - 99 mg/dL    Alkaline Phosphatase 136 40 - 150 U/L    AST 75 (H) 0 - 45 U/L    ALT 25 0 - 70 U/L    Protein Total 7.7 6.8 - 8.8 g/dL    Albumin 2.4 (L) 3.4 - 5.0 g/dL    Bilirubin Total 1.4 (H) 0.2 - 1.3 mg/dL    GFR Estimate 78 >60 mL/min/1.73m2   UA with Microscopic reflex to Culture     Status: Abnormal    Specimen: Urine, Catheter   Result Value Ref Range    Color Urine Yellow Colorless, Straw, Light Yellow, Yellow    Appearance Urine Clear Clear    Glucose Urine Negative Negative mg/dL    Bilirubin Urine Negative Negative    Ketones Urine Negative Negative mg/dL    Specific Gravity Urine 1.015 1.003 - 1.035    Blood Urine Moderate (A) Negative    pH Urine 5.0 5.0 - 7.0    Protein Albumin Urine 30  (A) Negative mg/dL    Urobilinogen Urine Normal Normal, 2.0 mg/dL    Nitrite Urine Negative Negative    Leukocyte Esterase Urine Negative Negative    Bacteria Urine None Seen None Seen /HPF    RBC Urine 68 (H) <=2 /HPF    WBC Urine 0 <=5 /HPF    Squamous Epithelials Urine 1 <=1 /HPF     Narrative    Urine Culture not indicated   Lactic acid whole blood     Status: Normal   Result Value Ref Range    Lactic Acid 1.5 0.7 - 2.0 mmol/L   CBC with platelets and differential     Status: Abnormal   Result Value Ref Range    WBC Count 14.6 (H) 4.0 - 11.0 10e3/uL    RBC Count 3.44 (L) 4.40 - 5.90 10e6/uL    Hemoglobin 11.3 (L) 13.3 - 17.7 g/dL    Hematocrit 33.8 (L) 40.0 - 53.0 %    MCV 98 78 - 100 fL    MCH 32.8 26.5 - 33.0 pg    MCHC 33.4 31.5 - 36.5 g/dL    RDW 14.8 10.0 - 15.0 %    Platelet Count 197 150 - 450 10e3/uL    % Neutrophils 83 %    % Lymphocytes 5 %    % Monocytes 8 %    % Eosinophils 2 %    % Basophils 1 %    % Immature Granulocytes 1 %    NRBCs per 100 WBC 0 <1 /100    Absolute Neutrophils 12.2 (H) 1.6 - 8.3 10e3/uL    Absolute Lymphocytes 0.7 (L) 0.8 - 5.3 10e3/uL    Absolute Monocytes 1.1 0.0 - 1.3 10e3/uL    Absolute Eosinophils 0.3 0.0 - 0.7 10e3/uL    Absolute Basophils 0.1 0.0 - 0.2 10e3/uL    Absolute Immature Granulocytes 0.2 (H) <=0.0 10e3/uL    Absolute NRBCs 0.0 10e3/uL   Extra Red Top Tube     Status: None   Result Value Ref Range    Hold Specimen JIC    Extra Green Top (Lithium Heparin) Tube     Status: None   Result Value Ref Range    Hold Specimen JIC    Extra Purple Top Tube     Status: None   Result Value Ref Range    Hold Specimen JIC    Hemaglobin A1C     Status: Abnormal   Result Value Ref Range    Hemoglobin A1C 6.2 (H) 0.0 - 5.6 %   Asymptomatic COVID-19 Virus (Coronavirus) by PCR Nasopharyngeal     Status: Normal    Specimen: Nasopharyngeal; Swab   Result Value Ref Range    SARS CoV2 PCR Negative Negative    Narrative    Testing was performed using the clover  SARS-CoV-2 & Influenza A/B Assay on the clover  Arlene  System.  This test should be ordered for the detection of SARS-COV-2 in individuals who meet SARS-CoV-2 clinical and/or epidemiological criteria. Test performance is unknown in asymptomatic patients.  This test is for in vitro diagnostic use under the FDA EUA  for laboratories certified under CLIA to perform moderate and/or high complexity testing. This test has not been FDA cleared or approved.  A negative test does not rule out the presence of PCR inhibitors in the specimen or target RNA in concentration below the limit of detection for the assay. The possibility of a false negative should be considered if the patient's recent exposure or clinical presentation suggests COVID-19.  [facility] are certified under the Clinical Laboratory Improvement Amendments of 1988 (CLIA-88) as qualified to perform moderate and/or high complexity laboratory testing.   Sodium random urine     Status: None   Result Value Ref Range    Sodium Urine mmol/L 47 mmol/L   CRP Inflammation     Status: Abnormal   Result Value Ref Range    CRP Inflammation 19.4 (H) 0.0 - 8.0 mg/L   Creatinine     Status: Normal   Result Value Ref Range    Creatinine 0.92 0.66 - 1.25 mg/dL    GFR Estimate 88 >60 mL/min/1.73m2   CBC with platelets, differential     Status: Abnormal    Narrative    The following orders were created for panel order CBC with platelets, differential.  Procedure                               Abnormality         Status                     ---------                               -----------         ------                     CBC with platelets and d...[[id number]]  Abnormal            Final result                 Please view results for these tests on the individual orders.   [place] Draw     Status: None (In process)    Narrative    The following orders were created for panel order [place] Draw.  Procedure                               Abnormality         Status                     ---------                               -----------         ------                     Extra Blood Culture Bottle[[id number]]                                                  Extra Red Top Tube[[id number]]                               Final result               Extra Green Top  (Lithium...[[id number]]                      Final result               Extra Purple Top Tube[[id number]]                            Final result               Extra Blood Bank Purple ...[[id number]]                                                   Please view results for these tests on the individual orders.       Assessments & Plan (with Medical Decision Making)   Assessment Summary and Clinical Impression: 66-year-old male who presented with 3-day history of abdominal distention, scrotal swelling, decreased urine output and increasing pain and feeling unwell.  Rapid assessment in urgent care due to large scrotal swelling redness without warmth about the beltline.  See photo images in the physical exam section above for distribution of skin changes on the perineum and scrotum. Patient reported increasing scrotal pain and discomfort.  No scrotal trauma.  He appeared ill but not toxic he was hypertensive on arrival and hypothermic temp was 96.5.  Due to acuity of illness and concern for critical diagnoses including Rhina's gangrene/necrotizing infection based on chaperoned exam empiric antimicrobials were initiated.  See photo images in the physical exam section above for distribution of skin changes around the perineum scrotum and lower abdomen.  Photo images were obtained after informed verbal consent from the patient was chaperoned exam. After consultation with general surgery locally plan to transfer to higher level of care due to likely need for further intervention and definitive management in need of higher level of care including consultation with urology, and/or plastics versus other. At shift end patient was awaiting transfer as advised by general surgery locally with bed search on-going. If prolonged boarding patient may required interval imaging for comparison if persistent pain or change in ED course.      ED course and Plan:   Rapid assessment in urgent care due to high occupancy, and concern for  marked scrotal swelling abdominal pain with distention and concern for critical illness.  After history and assessment concern for necrotizing soft tissue infection/scrotal cellulitis or Rhina's gangrene resuscitative care was initiated.  Covarrubias indwelling-for both bladder decompression, empiric antibiotics with Zosyn and clindamycin initiated.  Patient's work-up revealed a normal lactate on arrival.  Elevated AST and total bilirubin.  Patient's glucose is 120.  Patient sodium was 124.  Leukocytosis with a left shift. A1c-6.2. CRP -19.4. urine sodium- 47.  CT imaging with contrast revealed bilateral prominent scrotal  hydrocele with diffuse edema and some thickening at the perineum and bilateral proximal thighs.  There is no subcutaneous abscess or bubbles are gas identified to suggest a necrotizing process.  Interval repeat imaging may be helpful per radiology if clinical concern persists for Rhina's or other acute process.  Anasarca and small ascites was noted.  Bibasilar atelectasis was also appreciated with small bibasilar pleural fluid. See detail in the radiology report.  Patient was noted to have acute hyponatremia, maintenance infusion continued during his ED course with anasarca noted on imaging pending serum osmolality and urine sodium.    I reviewed patient's history and presentation and imaging with general surgeon on-call. I spoke with Dr Gonsales at 1.40pm who reviewed imaging and photo images.  We discussed working diagnosis and concern for Rhina's gangrene/ necrotizing soft tissue infection given pain, acuity of presentation, scrotal and perineal findings on exam. We agreed to be best served with transfer to high-level care where there is urology and plastics and other potential services that patient may require if surgical intervention is undertaken.    Patient had transient hypoxia after intravenous hydromorphone compounded by his body habitus, abdominal protuberance and anasarca on  imaging.  He required supplemental oxygen but did confirm that he has a history of COPD but is not oxygen dependent.  He is on trilogy and Combivent.  Clement oxygen was provided.  Saturations dropped to 77% on room air.  He is 88% on 3 L per nasal cannula.    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  He is continued on triple antimicrobial coverage empirically.  Of care at handoff was reviewed with the patient.  Frequency of intravenous therapies for pain management was also adjusted.    In the event of prolonged boarding in the emergency department which is anticipated given critical bed shortage I ordered AM labs (BMP-acute hyponatremia) and follow-up labs (repeat lactate and CRP- if persistent pain for 2100)      Disclaimer: This note consists of symbols derived from keyboarding, dictation and/or voice recognition software. As a result, there may be errors in the script that have gone undetected. Please consider this when interpreting information found in this chart.  I have reviewed the nursing notes.    I have reviewed the findings, diagnosis, plan and need for follow up with the patient.       New Prescriptions    No medications on file       Final diagnoses:   Scrotal swelling - progressive by report over the last 3 days. Suspect multifactorial-anasarca versus necrotizing soft tissue infection versus other   Anasarca   Abnormal CT scan - Revealed anasarca with bilateral scrotal hydrocephalus.  Need to monitor for potential tissue infection however no acute findings to suggest excising fasciitis or Rhina's gangrene   History of COPD - On trilogy and Combivent.  No home oxygen needs   Hyponatremia       9/27/2021   [facility] EMERGENCY DEPT     Nakul Campos MD  09/27/21 [id number]

## 2021-09-27 NOTE — ED PROVIDER NOTES
"     Emergency Department Patient Sign-out       Brief HPI:  This is a 66 year old male signed out to me by Dr. Mcelroy .  See initial ED Provider note for details of the presentation.            Significant Events prior to my assuming care:       Exam:   Patient Vitals for the past 24 hrs:   BP Temp Temp src Pulse Resp SpO2 Height Weight   09/27/21 1620 (!) 155/82 -- -- -- -- -- -- --   09/27/21 1600 (!) 157/74 -- -- 63 -- 91 % -- --   09/27/21 1550 130/71 -- -- -- -- 91 % -- --   09/27/21 1530 (!) 142/72 -- -- 68 -- -- -- --   09/27/21 1510 -- -- -- -- -- 92 % -- --   09/27/21 1500 (!) 147/74 -- -- 68 -- 91 % -- --   09/27/21 1450 139/69 -- -- -- -- 90 % -- --   09/27/21 1430 (!) 140/71 -- -- 69 -- 92 % -- --   09/27/21 1415 (!) 146/79 -- -- 63 -- 90 % -- --   09/27/21 1130 (!) 157/81 -- -- 69 14 -- -- --   09/27/21 0957 (!) 163/105 (!) 96.5  F (35.8  C) Temporal 76 18 91 % 1.676 m (5' 6\") 95.3 kg (210 lb)           ED RESULTS:   Results for orders placed or performed during the hospital encounter of 09/27/21 (from the past 24 hour(s))   Comprehensive metabolic panel     Status: Abnormal    Collection Time: 09/27/21 11:14 AM   Result Value Ref Range    Sodium 124 (L) 133 - 144 mmol/L    Potassium 4.4 3.4 - 5.3 mmol/L    Chloride 91 (L) 94 - 109 mmol/L    Carbon Dioxide (CO2) 28 20 - 32 mmol/L    Anion Gap 5 3 - 14 mmol/L    Urea Nitrogen 10 7 - 30 mg/dL    Creatinine 1.01 0.66 - 1.25 mg/dL    Calcium 8.3 (L) 8.5 - 10.1 mg/dL    Glucose 120 (H) 70 - 99 mg/dL    Alkaline Phosphatase 136 40 - 150 U/L    AST 75 (H) 0 - 45 U/L    ALT 25 0 - 70 U/L    Protein Total 7.7 6.8 - 8.8 g/dL    Albumin 2.4 (L) 3.4 - 5.0 g/dL    Bilirubin Total 1.4 (H) 0.2 - 1.3 mg/dL    GFR Estimate 78 >60 mL/min/1.73m2   Luther Draw     Status: None    Collection Time: 09/27/21 11:14 AM    Narrative    The following orders were created for panel order Luther Draw.  Procedure                               Abnormality         Status           "           ---------                               -----------         ------                     Extra Blood Culture Bottle[[id number]]                                                  Extra Red Top Tube[[id number]]                               Final result               Extra Green Top (Lithium...[[id number]]                      Final result               Extra Purple Top Tube[[id number]]                            Final result               Extra Blood Bank Purple ...[[id number]]                                                   Please view results for these tests on the individual orders.   Extra Red Top Tube     Status: None    Collection Time: 09/27/21 11:14 AM   Result Value Ref Range    Hold Specimen J[facility]    Extra Green Top (Lithium Heparin) Tube     Status: None    Collection Time: 09/27/21 11:14 AM   Result Value Ref Range    Hold Specimen JIC    CRP Inflammation     Status: Abnormal    Collection Time: 09/27/21 11:14 AM   Result Value Ref Range    CRP Inflammation 19.4 (H) 0.0 - 8.0 mg/L   Creatinine     Status: Normal    Collection Time: 09/27/21 11:14 AM   Result Value Ref Range    Creatinine 0.92 0.66 - 1.25 mg/dL    GFR Estimate 88 >60 mL/min/1.73m2   CBC with platelets, differential     Status: Abnormal    Collection Time: 09/27/21 11:15 AM    Narrative    The following orders were created for panel order CBC with platelets, differential.  Procedure                               Abnormality         Status                     ---------                               -----------         ------                     CBC with platelets and d...[[id number]]  Abnormal            Final result                 Please view results for these tests on the individual orders.   Lactic acid whole blood     Status: Normal    Collection Time: 09/27/21 11:15 AM   Result Value Ref Range    Lactic Acid 1.5 0.7 - 2.0 mmol/L   CBC with platelets and differential     Status: Abnormal    Collection Time: 09/27/21 11:15 AM   Result  Value Ref Range    WBC Count 14.6 (H) 4.0 - 11.0 10e3/uL    RBC Count 3.44 (L) 4.40 - 5.90 10e6/uL    Hemoglobin 11.3 (L) 13.3 - 17.7 g/dL    Hematocrit 33.8 (L) 40.0 - 53.0 %    MCV 98 78 - 100 fL    MCH 32.8 26.5 - 33.0 pg    MCHC 33.4 31.5 - 36.5 g/dL    RDW 14.8 10.0 - 15.0 %    Platelet Count 197 150 - 450 10e3/uL    % Neutrophils 83 %    % Lymphocytes 5 %    % Monocytes 8 %    % Eosinophils 2 %    % Basophils 1 %    % Immature Granulocytes 1 %    NRBCs per 100 WBC 0 <1 /100    Absolute Neutrophils 12.2 (H) 1.6 - 8.3 10e3/uL    Absolute Lymphocytes 0.7 (L) 0.8 - 5.3 10e3/uL    Absolute Monocytes 1.1 0.0 - 1.3 10e3/uL    Absolute Eosinophils 0.3 0.0 - 0.7 10e3/uL    Absolute Basophils 0.1 0.0 - 0.2 10e3/uL    Absolute Immature Granulocytes 0.2 (H) <=0.0 10e3/uL    Absolute NRBCs 0.0 10e3/uL   Extra Purple Top Tube     Status: None    Collection Time: 09/27/21 11:15 AM   Result Value Ref Range    Hold Specimen [facility]    Hemaglobin A1C     Status: Abnormal    Collection Time: 09/27/21 11:15 AM   Result Value Ref Range    Hemoglobin A1C 6.2 (H) 0.0 - 5.6 %   CT Abdomen Pelvis w Contrast     Status: None    Collection Time: 09/27/21 12:02 PM    Narrative    CT ABDOMEN PELVIS WITH CONTRAST 9/27/2021 12:02 PM    CLINICAL HISTORY: Abdominal distension. Concern for Rhina's  gangrene or necrotizing soft tissue infection.  Scrotal swelling,  urinary retention abdominal distention and scrotal swelling.    TECHNIQUE: CT scan of the abdomen and pelvis was performed following  injection of IV contrast. Multiplanar reformats were obtained. Dose  reduction techniques were used.  CONTRAST: 100mL Isovue-370    COMPARISON: CT abdomen and pelvis 12/20/2006.    FINDINGS:   LOWER CHEST: Bibasilar atelectasis. Small bibasilar pleural fluid.  Acute airspace disease not entirely excluded at the lower lungs.    HEPATOBILIARY: No acute liver abnormality. No calcified gallstones.  Mild gallbladder wall thickening.    PANCREAS:  Normal.    SPLEEN: Normal.    ADRENAL GLANDS: Normal.    KIDNEYS/BLADDER: No hydronephrosis. Nonobstructing stone lower left  kidney is 0.2 cm series 3 image 122. A few renal cysts bilaterally not  requiring specific imaging follow-up. Catheter decompresses the  bladder.    BOWEL: No bowel obstruction. No evidence for appendicitis. No specific  acute bowel abnormality.    PELVIC ORGANS: Bilateral prominent scrotal hydroceles. Diffuse  subcutaneous edema along the pelvis, perineal region and proximal  bilateral thighs along with skin thickening no focal fluid to suggest  abscess. No subcutaneous gas can be seen at this time. There is also  diffuse anasarca involving the abdomen and pelvis.    ADDITIONAL FINDINGS: Vascular calcifications. No suspicious lymph  node. There is small ascites.    MUSCULOSKELETAL: Diffuse spine degenerative change. No acute  abnormality.      Impression    IMPRESSION:   1.  Bilateral prominent scrotal hydroceles. Diffuse edema and some  skin thickening at the perineum, and bilateral proximal thighs. No  subcutaneous abscess or bubbles of subcutaneous gas identified at this  time to suggest a necrotizing process. However, if there is  progressive clinical concern repeat scanning could be performed.  2.  Anasarca. Small ascites.  3.  Bibasilar atelectasis and/or acute airspace disease at the lower  lungs. Small bibasilar pleural fluid.  4.  No other acute abnormality.    CAESAR HANSON MD         SYSTEM ID:  [id number]   UA with Microscopic reflex to Culture     Status: Abnormal    Collection Time: 09/27/21  1:44 PM    Specimen: Urine, Catheter   Result Value Ref Range    Color Urine Yellow Colorless, Straw, Light Yellow, Yellow    Appearance Urine Clear Clear    Glucose Urine Negative Negative mg/dL    Bilirubin Urine Negative Negative    Ketones Urine Negative Negative mg/dL    Specific Gravity Urine 1.015 1.003 - 1.035    Blood Urine Moderate (A) Negative    pH Urine 5.0 5.0 - 7.0    Protein  Albumin Urine 30  (A) Negative mg/dL    Urobilinogen Urine Normal Normal, 2.0 mg/dL    Nitrite Urine Negative Negative    Leukocyte Esterase Urine Negative Negative    Bacteria Urine None Seen None Seen /HPF    RBC Urine 68 (H) <=2 /HPF    WBC Urine 0 <=5 /HPF    Squamous Epithelials Urine 1 <=1 /HPF    Narrative    Urine Culture not indicated   Sodium random urine     Status: None    Collection Time: 09/27/21  1:44 PM   Result Value Ref Range    Sodium Urine mmol/L 47 mmol/L   Asymptomatic COVID-19 Virus (Coronavirus) by PCR Nasopharyngeal     Status: Normal    Collection Time: 09/27/21  1:45 PM    Specimen: Nasopharyngeal; Swab   Result Value Ref Range    SARS CoV2 PCR Negative Negative    Narrative    Testing was performed using the clover  SARS-CoV-2 & Influenza A/B Assay on the clover  Arlene  System.  This test should be ordered for the detection of SARS-COV-2 in individuals who meet SARS-CoV-2 clinical and/or epidemiological criteria. Test performance is unknown in asymptomatic patients.  This test is for in vitro diagnostic use under the FDA EUA for laboratories certified under CLIA to perform moderate and/or high complexity testing. This test has not been FDA cleared or approved.  A negative test does not rule out the presence of PCR inhibitors in the specimen or target RNA in concentration below the limit of detection for the assay. The possibility of a false negative should be considered if the patient's recent exposure or clinical presentation suggests COVID-19.  [facility] are certified under the Clinical Laboratory Improvement Amendments of 1988 (CLIA-88) as qualified to perform moderate and/or high complexity laboratory testing.       ED MEDICATIONS:   Medications   clindamycin (CLEOCIN) infusion 900 mg (0 mg Intravenous Stopped 9/27/21 1326)   piperacillin-tazobactam (ZOSYN) infusion 3.375 g (0 g Intravenous Stopped 9/27/21 1214)   vancomycin (VANCOCIN) 1000 mg in dextrose 5% 200 mL  "PREMIX (0 mg Intravenous Stopped 9/27/21 1502)   dextrose 5% and 0.9% NaCl infusion ( Intravenous New Bag 9/27/21 1437)   HYDROmorphone (PF) (DILAUDID) injection 0.5 mg (has no administration in time range)   0.9% sodium chloride BOLUS (0 mLs Intravenous Stopped 9/27/21 1326)   iopamidol (ISOVUE-370) solution 100 mL (100 mLs Intravenous Given 9/27/21 1144)   sodium chloride 0.9 % bag 500mL for CT scan flush use (66 mLs Intravenous Given 9/27/21 1144)         Impression:    ICD-10-CM    1. Scrotal swelling  N50.89     progressive by report over the last 3 days. Suspect multifactorial-anasarca versus necrotizing soft tissue infection versus other   2. Anasarca  R60.1    3. Abnormal CT scan  R93.89     Revealed anasarca with bilateral scrotal hydrocephalus.  Need to monitor for potential tissue infection however no acute findings to suggest excising fasciitis or Rhina's gangrene   4. History of COPD  Z87.09     On trilogy and Combivent.  No home oxygen needs   5. Hyponatremia  E87.1        Patient Vitals for the past 24 hrs:   BP Temp Temp src Pulse Resp SpO2 Height Weight   09/27/21 1620 (!) 155/82 -- -- -- -- -- -- --   09/27/21 1600 (!) 157/74 -- -- 63 -- 91 % -- --   09/27/21 1550 130/71 -- -- -- -- 91 % -- --   09/27/21 1530 (!) 142/72 -- -- 68 -- -- -- --   09/27/21 1510 -- -- -- -- -- 92 % -- --   09/27/21 1500 (!) 147/74 -- -- 68 -- 91 % -- --   09/27/21 1450 139/69 -- -- -- -- 90 % -- --   09/27/21 1430 (!) 140/71 -- -- 69 -- 92 % -- --   09/27/21 1415 (!) 146/79 -- -- 63 -- 90 % -- --   09/27/21 1130 (!) 157/81 -- -- 69 14 -- -- --   09/27/21 0957 (!) 163/105 (!) 96.5  F (35.8  C) Temporal 76 18 91 % 1.676 m (5' 6\") 95.3 kg (210 lb)       Plan:    Pending studies include none.    Patient has findings suggestive of Rhina's gangrene or the precursors of this with extensive swelling and erythema of the perineum and scrotum.  Significant underlying risks including morbid obesity, diabetes.  CT is at the " least reassuring with no obvious gas or necrotic changes but this may be early and serial imaging likely will be needed.  Plan transfer to higher level of care in case debridement is emergently needed.   He is on Zosyn, clindamycin, vancomycin.    Patient remained clinically stable.  Discussed with [facility].  Dr. Jacobo will see the patient in the [facility] emergency department.    I spoke with the patient regarding his transfer to [facility]. .    MD Baldomero Silvestre Scott J, MD  09/27/21 5209

## 2021-09-27 NOTE — PHARMACY-VANCOMYCIN DOSING SERVICE
"Pharmacy Vancomycin Initial Note  Date of Service 2021  Patient's  1955  66 year old, male    Indication: Skin and Soft Tissue Infection    Current estimated CrCl = Estimated Creatinine Clearance: 79.8 mL/min (based on SCr of 1.01 mg/dL).    Creatinine for last 3 days  2021: 11:14 AM Creatinine 1.01 mg/dL    Recent Vancomycin Level(s) for last 3 days  No results found for requested labs within last 72 hours.      Vancomycin IV Administrations (past 72 hours)      No vancomycin orders with administrations in past 72 hours.                Nephrotoxins and other renal medications (From now, onward)    Start     Dose/Rate Route Frequency Ordered Stop    21 1330  vancomycin (VANCOCIN) 1000 mg in dextrose 5% 200 mL PREMIX      1,000 mg  200 mL/hr over 1 Hours Intravenous EVERY 12 HOURS 21 1321      21 1100  piperacillin-tazobactam (ZOSYN) infusion 3.375 g     Note to Pharmacy: For [facility], [facility] and [facility]: For Zosyn-naive patients, use the \"Zosyn initial dose + extended infusion\" order panel.    3.375 g  100 mL/hr over 30 Minutes Intravenous EVERY 6 HOURS 21 1043            Contrast Orders - past 72 hours (72h ago, onward)    Start     Dose/Rate Route Frequency Ordered Stop    21 1100  iopamidol (ISOVUE-370) solution 100 mL      100 mL Intravenous ONCE 21 1057 21 1144        Loading dose: N/A  Regimen: 1000 mg IV every 12 hours.  Start time: 13:22 on 2021  Exposure target: AUC24 (range)400-600 mg/L.hr   AUC24,ss: 494 mg/L.hr  Probability of AUC24 > 400: 73 %  Ctrough,ss: 16.2 mg/L  Probability of Ctrough,ss > 20: 30 %  Probability of nephrotoxicity (Lodise WARREN ): 12 %            Plan:  1. Start vancomycin  1000 mg IV q12h.   2. Vancomycin monitoring method: AUC  3. Vancomycin therapeutic monitoring goal: 400-600 mg*h/L  4. Pharmacy will check vancomycin levels as appropriate in 1-3 Days.    5. Serum creatinine levels will be ordered daily for the first " week of therapy and at least twice weekly for subsequent weeks.      Yoli Ham, [facility]

## 2022-01-01 ENCOUNTER — NURSING HOME VISIT (OUTPATIENT)
Dept: GERIATRICS | Facility: CLINIC | Age: 67
End: 2022-01-01
Payer: MEDICARE

## 2022-01-01 ENCOUNTER — DOCUMENTATION ONLY (OUTPATIENT)
Dept: OTHER | Facility: CLINIC | Age: 67
End: 2022-01-01

## 2022-01-01 VITALS
RESPIRATION RATE: 18 BRPM | HEART RATE: 91 BPM | SYSTOLIC BLOOD PRESSURE: 130 MMHG | TEMPERATURE: 98.8 F | OXYGEN SATURATION: 95 % | WEIGHT: 251 LBS | BODY MASS INDEX: 39.39 KG/M2 | HEIGHT: 67 IN | DIASTOLIC BLOOD PRESSURE: 75 MMHG

## 2022-01-01 VITALS
TEMPERATURE: 98.7 F | OXYGEN SATURATION: 96 % | HEIGHT: 67 IN | WEIGHT: 260 LBS | RESPIRATION RATE: 24 BRPM | BODY MASS INDEX: 40.81 KG/M2 | SYSTOLIC BLOOD PRESSURE: 125 MMHG | HEART RATE: 99 BPM | DIASTOLIC BLOOD PRESSURE: 69 MMHG

## 2022-01-01 VITALS
HEIGHT: 67 IN | OXYGEN SATURATION: 96 % | SYSTOLIC BLOOD PRESSURE: 133 MMHG | HEART RATE: 93 BPM | RESPIRATION RATE: 16 BRPM | WEIGHT: 246.6 LBS | BODY MASS INDEX: 38.71 KG/M2 | DIASTOLIC BLOOD PRESSURE: 76 MMHG | TEMPERATURE: 97.7 F

## 2022-01-01 DIAGNOSIS — F10.11 HISTORY OF ALCOHOL ABUSE: ICD-10-CM

## 2022-01-01 DIAGNOSIS — R60.1 ANASARCA: ICD-10-CM

## 2022-01-01 DIAGNOSIS — J44.9 CHRONIC OBSTRUCTIVE PULMONARY DISEASE, UNSPECIFIED COPD TYPE (H): ICD-10-CM

## 2022-01-01 DIAGNOSIS — I50.33 ACUTE ON CHRONIC DIASTOLIC HEART FAILURE (H): ICD-10-CM

## 2022-01-01 DIAGNOSIS — Z51.5 HOSPICE CARE PATIENT: ICD-10-CM

## 2022-01-01 DIAGNOSIS — E66.01 MORBID OBESITY (H): ICD-10-CM

## 2022-01-01 DIAGNOSIS — I25.119 CORONARY ARTERY DISEASE INVOLVING NATIVE CORONARY ARTERY OF NATIVE HEART WITH ANGINA PECTORIS (H): ICD-10-CM

## 2022-01-01 DIAGNOSIS — R60.1 ANASARCA: Primary | ICD-10-CM

## 2022-01-01 DIAGNOSIS — K70.30 ALCOHOLIC CIRRHOSIS OF LIVER WITHOUT ASCITES (H): ICD-10-CM

## 2022-01-01 DIAGNOSIS — I48.0 PAROXYSMAL ATRIAL FIBRILLATION (H): ICD-10-CM
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
## 2022-01-01 DIAGNOSIS — E11.9 TYPE 2 DIABETES MELLITUS WITHOUT COMPLICATION, WITHOUT LONG-TERM CURRENT USE OF INSULIN (H): ICD-10-CM

## 2022-01-01 DIAGNOSIS — E11.9 TYPE 2 DIABETES MELLITUS WITHOUT COMPLICATION, WITHOUT LONG-TERM CURRENT USE OF INSULIN (H): Primary | ICD-10-CM

## 2022-01-01 DIAGNOSIS — K70.30 ALCOHOLIC CIRRHOSIS OF LIVER WITHOUT ASCITES (H): Primary | ICD-10-CM

## 2022-01-01 PROCEDURE — 99309 SBSQ NF CARE MODERATE MDM 30: CPT | Mod: GV | Performed by: NURSE PRACTITIONER

## 2022-01-01 PROCEDURE — 99305 1ST NF CARE MODERATE MDM 35: CPT | Mod: GV | Performed by: FAMILY MEDICINE

## 2022-01-01 PROCEDURE — 99310 SBSQ NF CARE HIGH MDM 45: CPT | Mod: GV | Performed by: NURSE PRACTITIONER

## 2022-01-01 RX ORDER — ACETAMINOPHEN 325 MG/1
650 TABLET ORAL EVERY 4 HOURS PRN
COMMUNITY
Start: 2022-01-01

## 2022-01-01 RX ORDER — FUROSEMIDE 40 MG
40 TABLET ORAL 2 TIMES DAILY
COMMUNITY
Start: 2022-01-01

## 2022-01-01 RX ORDER — OXYCODONE HYDROCHLORIDE 5 MG/1
5 TABLET ORAL EVERY 4 HOURS PRN
COMMUNITY
Start: 2022-01-01

## 2022-01-01 RX ORDER — LACTULOSE 10 G/15ML
20 SOLUTION ORAL 3 TIMES DAILY
COMMUNITY
Start: 2022-01-01

## 2022-01-01 RX ORDER — CALCIUM CARBONATE 500 MG/1
2 TABLET, CHEWABLE ORAL 3 TIMES DAILY PRN
COMMUNITY
Start: 2022-01-01

## 2022-08-19 PROBLEM — K70.31 ALCOHOLIC CIRRHOSIS OF LIVER WITH ASCITES (H): Status: ACTIVE | Noted: 2021-09-30

## 2022-08-19 PROBLEM — M10.00 IDIOPATHIC GOUT: Status: ACTIVE | Noted: 2021-01-01

## 2022-08-19 PROBLEM — R73.03 PREDIABETES: Status: ACTIVE | Noted: 2021-01-01

## 2022-08-19 PROBLEM — J44.9 CHRONIC OBSTRUCTIVE PULMONARY DISEASE (H): Status: ACTIVE | Noted: 2021-09-30

## 2022-08-19 PROBLEM — I50.32: Status: ACTIVE | Noted: 2021-01-01

## 2022-08-19 PROBLEM — I25.10 CORONARY ARTERY DISEASE INVOLVING NATIVE CORONARY ARTERY OF NATIVE HEART: Status: ACTIVE | Noted: 2021-01-01

## 2022-08-19 PROBLEM — G47.33 OSA (OBSTRUCTIVE SLEEP APNEA): Status: ACTIVE | Noted: 2021-01-01

## 2022-08-19 PROBLEM — N18.32 STAGE 3B CHRONIC KIDNEY DISEASE (H): Status: ACTIVE | Noted: 2019-03-19

## 2022-08-19 PROBLEM — S22.41XA CLOSED FRACTURE OF MULTIPLE RIBS OF RIGHT SIDE: Status: ACTIVE | Noted: 2019-03-18

## 2022-08-19 PROBLEM — I48.0 PAROXYSMAL ATRIAL FIBRILLATION (H): Status: ACTIVE | Noted: 2019-10-24

## 2022-08-19 PROBLEM — R60.1 ANASARCA: Status: ACTIVE | Noted: 2022-01-01

## 2022-08-19 PROBLEM — I50.33 ACUTE ON CHRONIC DIASTOLIC HEART FAILURE (H): Status: ACTIVE | Noted: 2021-09-30

## 2022-08-19 PROBLEM — N17.9 ACUTE RENAL FAILURE (H): Status: ACTIVE | Noted: 2021-09-30

## 2022-08-19 PROBLEM — E11.9 TYPE 2 DIABETES MELLITUS WITHOUT COMPLICATION, WITHOUT LONG-TERM CURRENT USE OF INSULIN (H): Status: ACTIVE | Noted: 2021-01-01

## 2022-08-19 PROBLEM — K70.10 ALCOHOLIC HEPATITIS (H): Status: ACTIVE | Noted: 2019-03-18

## 2022-08-19 PROBLEM — G47.30 SLEEP APNEA: Status: ACTIVE | Noted: 2021-09-30

## 2022-08-19 PROBLEM — N18.30 STAGE 3 CHRONIC KIDNEY DISEASE (H): Status: ACTIVE | Noted: 2019-03-19

## 2022-08-19 PROBLEM — R73.9 HYPERGLYCEMIA: Status: ACTIVE | Noted: 2021-09-30

## 2022-08-19 PROBLEM — L03.314 CELLULITIS OF GROIN: Status: ACTIVE | Noted: 2021-09-27

## 2022-08-19 PROBLEM — I85.10 SECONDARY ESOPHAGEAL VARICES WITHOUT BLEEDING (H): Status: ACTIVE | Noted: 2021-01-01

## 2022-08-19 PROBLEM — I25.10 CORONARY ATHEROSCLEROSIS: Status: ACTIVE | Noted: 2019-09-26

## 2022-08-19 PROBLEM — E78.5 HYPERLIPIDEMIA: Status: ACTIVE | Noted: 2019-10-24

## 2022-08-19 PROBLEM — E66.9 OBESITY WITH BODY MASS INDEX 30 OR GREATER: Status: ACTIVE | Noted: 2021-09-30

## 2022-08-19 PROBLEM — I10 PRIMARY HYPERTENSION: Status: ACTIVE | Noted: 2021-09-30

## 2022-08-19 PROBLEM — D64.9 NORMOCYTIC ANEMIA: Status: ACTIVE | Noted: 2019-09-26

## 2022-08-19 PROBLEM — K70.30 ALCOHOLIC CIRRHOSIS OF LIVER WITHOUT ASCITES (H): Status: ACTIVE | Noted: 2021-01-01

## 2022-08-22 NOTE — PROGRESS NOTES
[facility] Admission  PCP & CLINIC: Briana Jiang, NANCY CNP, [address]. / [facility] [id number]  Chief Complaint   Patient presents with     New Patient     Establish Care   Madeleine MRN: [id number]. Place of Service where encounter took place:  No question data found. Fadi Fernando Sr.  is a 67 year old  (1955), admitted to the above facility from  [facility]. Hospital stay 8/7 through 8/19. Admitted to this facility for  rehab, medical management, and nursing care. HPI information obtained from: facility chart records, facility staff, patient report, [facility] chart review, and Care Everywhere [facility] chart review.     Brief Summary of Hospital Course: Fadi presented to [facility] on 8/7 w/ SOB and edema. He was found to have full anasarca, from alcoholic cirrhosis. In the past 6 years he's lost his son, daughter, and his son's friend, plus his wife left him. He underwent treatment at [facility] around 2016. He struggled w/ ALMA DELIA w/ ATN. He saw multiple specialists and required treatment for skin issues. He has a follow up scheduled w/ hepatology.     Updates since admission to long term care unit: Fadi presented to LT on 8/19. Today, his biggest problem is his big belly and his concerns about medications. His appetite is good, but he complains about the food and the salt levels in everything. He's coordinating w/ family to attempt to clean out his apartment. He states he was given only weeks to live in the hospital, but then 6 months upon arrival to [facility]. He thinks that is going to continue to improve. He is upset that he is no longer on Aspirin, Metoprolol, or Diltiazem, worrying that his BP is going to go up. He's happy with his current BPs, but is nervous. He is sleeping well, has a fair appetite (but isn't happy about the food options). He states his bowels are moving 2-3x/day. He denies urinary sxs. He denies CP, palpitations, dizziness, or significant/new SOB.  He is having  some intermittent GERD, and is using as needed Tums a lot.  He is requesting to have omeprazole back and an increase in Tums as 1 tablet does not do very much.    CODE STATUS/ADVANCE DIRECTIVES DISCUSSION: Full code. Patient's living condition: lives in a skilled nursing facility. ALLERGIES: Clonidine, Ibuprofen, Lisinopril, Motrin [ibuprofen micronized], Niacin, and Niacin PAST MEDICAL HISTORY:  has a past medical history of Coronary artery disease and Hypertension.. PAST SURGICAL HISTORY:   has a past surgical history that includes appendectomy and hernia repair.. FAMILY HISTORY: family history includes Mental Illness in his maternal grandmother and mother; Substance Abuse in his daughter, son, and another family member; Suicide in his daughter and son.. SOCIAL HISTORY:   reports that he has quit smoking. He does not have any smokeless tobacco history on file. He reports current alcohol use. He reports that he does not use drugs.  Post Discharge Medication Reconciliation Status: discharge medications reconciled and changed, per note/orders.  Current Outpatient Medications   Medication Sig Dispense Refill     acetaminophen (TYLENOL) 325 MG tablet Take 650 mg by mouth every 4 hours as needed for pain       calcium carbonate (TUMS) 500 MG chewable tablet Take 2 chew tab by mouth 3 times daily as needed       furosemide (LASIX) 40 MG tablet Take 40 mg by mouth 2 times daily       ipratropium-albuterol (COMBIVENT RESPIMAT)  MCG/ACT inhaler Inhale 1 puff into the lungs 4 times daily as needed for shortness of breath / dyspnea or wheezing       lactulose (CHRONULAC) 10 GM/15ML solution Take 20 g by mouth 3 times daily       omeprazole (PRILOSEC) 20 MG DR capsule Take 1 capsule (20 mg) by mouth daily       oxyCODONE (ROXICODONE) 5 MG tablet Take 5 mg by mouth every 4 hours as needed for pain       ROS: Limited secondary to cognitive impairment but today pt reports the above and 10 point ROS of systems including  "Constitutional, Eyes, Respiratory, Cardiovascular, Gastroenterology, Genitourinary, Integumentary, Musculoskeletal, Psychiatric were all negative except for pertinent positives noted in my HPI.    Vitals: /76   Pulse 93   Temp 97.7  F (36.5  C)   Resp 16   Ht 1.702 m (5' 7\")   Wt 111.9 kg (246 lb 9.6 oz)   SpO2 96%   BMI 38.62 kg/m     BPs:    HRs:    Exam:  GENERAL APPEARANCE: Alert, in no distress, cooperative.   ENT: Mouth/posterior oropharynx intact w/ moist mucous membranes, hearing acuity Circle.  EYES: EOM, conjunctivae, lids, pupils and irises normal, PERRL2.   RESP: Respiratory effort fair, no respiratory distress, Lung sounds clear/diminished. On RA.   CV: Auscultation of heart reveals S1, S2, rate controlled and rhythm irregular, no murmur, no rub or gallop, Edema 2-3+ BLE. Peripheral pulses are 2+.  ABDOMEN: Normal bowel sounds, large, soft, non-tender abdomen, mildly distended, and no masses palpated.  SKIN: Inspection/Palpation of skin and subcutaneous tissue baseline w/ fragility. No wounds/rashes noted.   NEURO: CN II-XII at patient's baseline, sensation baseline PPS.  PSYCH: Insight, judgement, and memory are baseline impaired, affect and mood are happy/engaged.    Lab/Diagnostic data: Recent labs in [facility] reviewed by me today.     ASSESSMENT/PLAN:  Anasarca  Alcoholic cirrhosis of liver without ascites (H)  History of alcohol abuse  Acute on chronic diastolic heart failure (H)  Paroxysmal Atrial fibrillation (H)  Coronary artery disease involving native coronary artery of native heart with angina pectoris (H)  Chronic obstructive pulmonary disease, unspecified COPD type (H)  Type 2 diabetes mellitus without complication, without long-term current use of insulin (H)  Morbid obesity (H)  Hospice care patient  Acute on chronic. Progressive/Complex.     Provider counseled Fadi at length around his disease process specifically related to his liver and kidneys, his current medications, and his " goals of care.  Noting that he is full code.  He is not exactly thrilled with hospice care at this time, and says he is asked for certain things that he has not gotten.  He is having a friend bring him a four-wheel walker with a seat, because he says hospice will not get him one.  He is not sure why he is not on all of his previous medications.  Provider attempted to clarify this.    Given he is having some GERD symptoms, will restart his omeprazole.  Noting significant varices previously, and therefore this could prevent a significant and untimely demise.  We will also change Tums as noted below to accommodate what patient would like.    Unclear if hospice has been notified of 4 wheeled walker with seat request.  Nursing to coordinate care.    Fadi would like to see his hepatologist in early September for potential paracentesis or port placement for draining.    Fadi is currently moving out of his apartment and having family members help him make this transition.  He acknowledges that he needs help, but he was very sick, but he thinks he is getting better.  We will ask in-house psychologist to meet with him and support him during this time.  It is unclear that he understands his significant chronic illnesses, or his enrollment in hospice.  Provider coordinated care with  and nursing around this.  Follow up w/in 2-4 weeks or as needed.    Orders:  1. Change Tums to 2 tabs PO TID PRN. Dx: GERD.  2. Omeprazole 20mg PO Qday. Dx: GERD.   3. Hospice to obtain 4ww w/ seat. Dx: gait instability.   4. Hepatology visit for therapeutic paracentesis x1 on or around 9/7. Dx: liver failure.   5. ACP eval/tx x1. Dx: alcoholism.     Total time spent with patient visit was 35 min including patient visit and review of past records. Greater than 50% of total time spent with counseling Fadi directly and coordinating care with nursing and .     Electronically signed by:  NANCY Mccain CNP  DNP

## 2022-08-25 PROBLEM — E66.01 MORBID OBESITY (H): Status: ACTIVE | Noted: 2022-01-01

## 2022-08-25 PROBLEM — N18.32 STAGE 3B CHRONIC KIDNEY DISEASE (H): Status: RESOLVED | Noted: 2019-03-19 | Resolved: 2022-01-01

## 2022-08-25 PROBLEM — K44.9 DIAPHRAGMATIC HERNIA: Status: ACTIVE | Noted: 2019-05-16

## 2022-08-25 PROBLEM — N18.30 STAGE 3 CHRONIC KIDNEY DISEASE (H): Status: RESOLVED | Noted: 2019-03-19 | Resolved: 2022-01-01

## 2022-08-25 PROBLEM — R10.13 EPIGASTRIC PAIN: Status: ACTIVE | Noted: 2020-05-27

## 2022-08-25 PROBLEM — K31.9 DISORDER OF STOMACH: Status: ACTIVE | Noted: 2019-05-16

## 2022-08-25 PROBLEM — R60.9 EDEMA, UNSPECIFIED TYPE: Status: ACTIVE | Noted: 2022-01-01

## 2022-08-25 PROBLEM — K74.60 CIRRHOSIS OF LIVER (H): Status: ACTIVE | Noted: 2021-01-01

## 2022-08-25 PROBLEM — I85.10 SECONDARY ESOPHAGEAL VARICES WITHOUT BLEEDING (H): Status: RESOLVED | Noted: 2021-01-01 | Resolved: 2022-01-01

## 2022-08-25 NOTE — LETTER
8/23/2022        RE: Fadi Fernando Sr.  [address]  [address]  [facility] [address]        [facility] Admission  PCP & CLINIC: NANCY Anderson CNP, [address] [facility]. / [facility] [place] [address]  Chief Complaint   Patient presents with     New Patient     Establish Care   [place] MRN: [id number]. Place of Service where encounter took place:  No question data found. Fadi Fernando Sr.  is a 67 year old  (1955), admitted to the above facility from  [facility]. Hospital stay 8/7 through 8/19. Admitted to this facility for  rehab, medical management, and nursing care. HPI information obtained from: facility chart records, facility staff, patient report, [facility] chart review, and Care Everywhere [facility] chart review.     Brief Summary of Hospital Course: Fadi presented to [facility] on 8/7 w/ SOB and edema. He was found to have full anasarca, from alcoholic cirrhosis. In the past 6 years he's lost his son, daughter, and his son's friend, plus his wife left him. He underwent treatment at [facility] around 2016. He struggled w/ ALMA DELIA w/ ATN. He saw multiple specialists and required treatment for skin issues. He has a follow up scheduled w/ hepatology.     Updates since admission to long term care unit: Fadi presented to LTC on 8/19. Today, his biggest problem is his big belly and his concerns about medications. His appetite is good, but he complains about the food and the salt levels in everything. He's coordinating w/ family to attempt to clean out his apartment. He states he was given only weeks to live in the hospital, but then 6 months upon arrival to [facility]. He thinks that is going to continue to improve. He is upset that he is no longer on Aspirin, Metoprolol, or Diltiazem, worrying that his BP is going to go up. He's happy with his current BPs, but is nervous. He is sleeping well, has a fair appetite (but isn't happy about the food options). He states his bowels are moving 2-3x/day.  He denies urinary sxs. He denies CP, palpitations, dizziness, or significant/new SOB.  He is having some intermittent GERD, and is using as needed Tums a lot.  He is requesting to have omeprazole back and an increase in Tums as 1 tablet does not do very much.    CODE STATUS/ADVANCE DIRECTIVES DISCUSSION: Full code. Patient's living condition: lives in a skilled nursing facility. ALLERGIES: Clonidine, Ibuprofen, Lisinopril, Motrin [ibuprofen micronized], Niacin, and Niacin PAST MEDICAL HISTORY:  has a past medical history of Coronary artery disease and Hypertension.. PAST SURGICAL HISTORY:   has a past surgical history that includes appendectomy and hernia repair.. FAMILY HISTORY: family history includes Mental Illness in his maternal grandmother and mother; Substance Abuse in his daughter, son, and another family member; Suicide in his daughter and son.. SOCIAL HISTORY:   reports that he has quit smoking. He does not have any smokeless tobacco history on file. He reports current alcohol use. He reports that he does not use drugs.  Post Discharge Medication Reconciliation Status: discharge medications reconciled and changed, per note/orders.  Current Outpatient Medications   Medication Sig Dispense Refill     acetaminophen (TYLENOL) 325 MG tablet Take 650 mg by mouth every 4 hours as needed for pain       calcium carbonate (TUMS) 500 MG chewable tablet Take 2 chew tab by mouth 3 times daily as needed       furosemide (LASIX) 40 MG tablet Take 40 mg by mouth 2 times daily       ipratropium-albuterol (COMBIVENT RESPIMAT)  MCG/ACT inhaler Inhale 1 puff into the lungs 4 times daily as needed for shortness of breath / dyspnea or wheezing       lactulose (CHRONULAC) 10 GM/15ML solution Take 20 g by mouth 3 times daily       omeprazole (PRILOSEC) 20 MG DR capsule Take 1 capsule (20 mg) by mouth daily       oxyCODONE (ROXICODONE) 5 MG tablet Take 5 mg by mouth every 4 hours as needed for pain       ROS: Limited  "secondary to cognitive impairment but today pt reports the above and 10 point ROS of systems including Constitutional, Eyes, Respiratory, Cardiovascular, Gastroenterology, Genitourinary, Integumentary, Musculoskeletal, Psychiatric were all negative except for pertinent positives noted in my HPI.    Vitals: /76   Pulse 93   Temp 97.7  F (36.5  C)   Resp 16   Ht 1.702 m (5' 7\")   Wt 111.9 kg (246 lb 9.6 oz)   SpO2 96%   BMI 38.62 kg/m     BPs:    HRs:    Exam:  GENERAL APPEARANCE: Alert, in no distress, cooperative.   ENT: Mouth/posterior oropharynx intact w/ moist mucous membranes, hearing acuity [place].  EYES: EOM, conjunctivae, lids, pupils and irises normal, PERRL2.   RESP: Respiratory effort fair, no respiratory distress, Lung sounds clear/diminished. On RA.   CV: Auscultation of heart reveals S1, S2, rate controlled and rhythm irregular, no murmur, no rub or gallop, Edema 2-3+ BLE. Peripheral pulses are 2+.  ABDOMEN: Normal bowel sounds, large, soft, non-tender abdomen, mildly distended, and no masses palpated.  SKIN: Inspection/Palpation of skin and subcutaneous tissue baseline w/ fragility. No wounds/rashes noted.   NEURO: CN II-XII at patient's baseline, sensation baseline PPS.  PSYCH: Insight, judgement, and memory are baseline impaired, affect and mood are happy/engaged.    Lab/Diagnostic data: Recent labs in [facility] reviewed by me today.     ASSESSMENT/PLAN:  Anasarca  Alcoholic cirrhosis of liver without ascites (H)  History of alcohol abuse  Acute on chronic diastolic heart failure (H)  Paroxysmal Atrial fibrillation (H)  Coronary artery disease involving native coronary artery of native heart with angina pectoris (H)  Chronic obstructive pulmonary disease, unspecified COPD type (H)  Type 2 diabetes mellitus without complication, without long-term current use of insulin (H)  Morbid obesity (H)  Hospice care patient  Acute on chronic. Progressive/Complex.     Provider counseled Fadi at length " around his disease process specifically related to his liver and kidneys, his current medications, and his goals of care.  Noting that he is full code.  He is not exactly thrilled with hospice care at this time, and says he is asked for certain things that he has not gotten.  He is having a friend bring him a four-wheel walker with a seat, because he says hospice will not get him one.  He is not sure why he is not on all of his previous medications.  Provider attempted to clarify this.    Given he is having some GERD symptoms, will restart his omeprazole.  Noting significant varices previously, and therefore this could prevent a significant and untimely demise.  We will also change Tums as noted below to accommodate what patient would like.    Unclear if hospice has been notified of 4 wheeled walker with seat request.  Nursing to coordinate care.    Fadi would like to see his hepatologist in early September for potential paracentesis or port placement for draining.    Fadi is currently moving out of his apartment and having family members help him make this transition.  He acknowledges that he needs help, but he was very sick, but he thinks he is getting better.  We will ask in-house psychologist to meet with him and support him during this time.  It is unclear that he understands his significant chronic illnesses, or his enrollment in hospice.  Provider coordinated care with  and nursing around this.  Follow up w/in 2-4 weeks or as needed.    Orders:  1. Change Tums to 2 tabs PO TID PRN. Dx: GERD.  2. Omeprazole 20mg PO Qday. Dx: GERD.   3. Hospice to obtain 4ww w/ seat. Dx: gait instability.   4. Hepatology visit for therapeutic paracentesis x1 on or around 9/7. Dx: liver failure.   5. ACP eval/tx x1. Dx: alcoholism.     Total time spent with patient visit was 35 min including patient visit and review of past records. Greater than 50% of total time spent with counseling Fadi directly and  coordinating care with nursing and .     Electronically signed by:  Dr. Briana Jiang, NANCY CNP DNP                          Sincerely,        NANCY Anderson CNP

## 2022-09-01 NOTE — LETTER
9/1/2022        RE: Fadi Fernando Sr.  [address] [place] [address]  [address]  [facility] [address]        [facility]  Episodic/Acute/Follow-Up  Madeleine MRN: [id number]. Place of Service where encounter took place[facility] () [[id number]]   Chief Complaint   Patient presents with     RECHECK    HPI: Fadi Fernando Sr.  is a 67 year old (1955), who is being seen today for an episodic care visit.  HPI information obtained from: facility chart records, facility staff, patient report and [facility] chart review. Today's concern is:    Fadi seen today on follow-up after his admission to long-term care and after nursing reported a fall a few days ago.  Fadi recalls this fall, saying that he face planted behind the entry door.  He shows me his bruise.  Given this, he promptly denies headaches, dizziness, vision changes.  He says it was just an hoops.  He does not ever remember passing out or losing consciousness.  He denies any other injury.  His blood pressures have been good, and he is happy about this.  His main concern is obtaining a paracentesis in early September.  He denies shortness of breath, and his inhaler was restarted after it was found in his room by nursing without an order.  This was likely discontinued by hospice.  His swelling is the same, but its not bothering him.  His appetite is fair, and he denies nausea or heartburn.  He is having 3+ bowel movements per day.    Past Medical and Surgical History reviewed in Epic today.  MEDICATIONS:  Current Outpatient Medications   Medication Sig Dispense Refill     umeclidinium (INCRUSE ELLIPTA) 62.5 MCG/INH inhaler Inhale 1 puff into the lungs daily       acetaminophen (TYLENOL) 325 MG tablet Take 650 mg by mouth every 4 hours as needed for pain       calcium carbonate (TUMS) 500 MG chewable tablet Take 2 chew tab by mouth 3 times daily as needed       furosemide (LASIX) 40 MG tablet Take 40 mg by mouth 2 times daily        "ipratropium-albuterol (COMBIVENT RESPIMAT)  MCG/ACT inhaler Inhale 1 puff into the lungs 4 times daily as needed for shortness of breath / dyspnea or wheezing       lactulose (CHRONULAC) 10 GM/15ML solution Take 20 g by mouth 3 times daily       omeprazole (PRILOSEC) 20 MG DR capsule Take 1 capsule (20 mg) by mouth daily       oxyCODONE (ROXICODONE) 5 MG tablet Take 5 mg by mouth every 4 hours as needed for pain       REVIEW OF SYSTEMS: Limited secondary to cognitive impairment but today pt reports the above and 4 point ROS including Respiratory, CV, GI and , other than that noted in the HPI, is negative.    Objective: /75   Pulse 91   Temp 98.8  F (37.1  C)   Resp 18   Ht 1.702 m (5' 7\")   Wt 113.9 kg (251 lb)   SpO2 95%   BMI 39.31 kg/m    Exam:  GENERAL APPEARANCE: Alert, in no distress, cooperative.   ENT: Mouth/posterior oropharynx intact w/ moist mucous membranes, hearing acuity [place].  EYES: EOM, conjunctivae, lids, pupils and irises normal, PERRL2.   RESP: Respiratory effort fair, no respiratory distress, On RA.   CV: Edema 3-4+ BLE. Peripheral pulses are 2+.  ABDOMEN: Normal bowel sounds, soft, large, rounded non-tender abdomen, and no masses palpated.  SKIN: Inspection/Palpation of skin and subcutaneous tissue baseline w/ fragility. No wounds/rashes noted.   NEURO: CN II-XII at patient's baseline, sensation baseline PPS.  PSYCH: Insight, judgement, and memory are forgetful at baseline, affect and mood are happy/engaged.    Labs: Labs done in facility are in EPIC. Please refer to them using [employer]/Care Everywhere.    ASSESSMENT/PLAN:  Alcoholic cirrhosis of liver without ascites (H)  Anasarca  History of alcohol abuse  Chronic obstructive pulmonary disease, unspecified COPD type (H)  Falls, initial encounter  Acute on chronic. Ongoing.    No acute injury from fall, and given patient is on hospice he does not qualify for PT/OT.    Fadi would like to remain on hospice, but still pursue " periodic paracentesis.    Provider coordinated care with nursing, and clarified inhaler orders.    Nursing and [facility] working to schedule paracentesis.  Follow up routinely or as needed.    Orders:  No new orders.    Electronically signed by:  NANCY Mccain CNP DNP          Sincerely,        NANCY Anderson CNP

## 2022-09-01 NOTE — PROGRESS NOTES
[facility] GERIATRICS  Primary Care Provider & Clinic: NANCY Anderson CNP, [address] [facility] [address]  Chief Complaint   Patient presents with     [facility]     [place] 8/7/2022 - 8/19/2022     [facility] Medical Record Number: [id number]  Place of Service Where Encounter Took Place: [facility] () [[id number]]    Fadi Fernando Sr. is a 67 year old (1955), admitted to the above facility from  [facility]. Hospital stay 8/7/22 through 8/19/22. Admitted to this facility for rehab, medical management, nursing care and hospice.    HPI:    HPI information obtained from: facility chart records, facility staff, patient report and [facility] chart review.   Brief Summary of Hospital Course:   Pt with PMH of Alcoholic cirrhosis, hospitalized with anasarca, ALMA DELIA with [facility] (not a candidate for HD). Due to poor prognosis endorsed into hospice care      Today  -Patient was seen and examined today.  - RN reported patient scheduled for paracentesis tomorrow.  Patient's reported abdominal pain and reported so distended now.  Report that the last time he had it was last month ago and about 10 L were drained.  Denies nausea or vomiting.  Reports appetite okay.  Shared that he would like to play for 1 year. Endorses edema still there  - Reported to have tremor which is a new patient endorses at least another tremor but not interfering with meal  Patient Sridhar Timioswaldo ELLI to use vitamins over-the-counter-     Code Status/Advanced Directives Discussion: DNR only  Patient's Living Condition: lives in a skilled nursing facility  ALLERGIES: Clonidine, Ibuprofen, Lisinopril, Motrin [ibuprofen micronized], Niacin, and Niacin  PAST MEDICAL HISTORY:  has a past medical history of Coronary artery disease and Hypertension.  PAST SURGICAL HISTORY:  has a past surgical history that includes appendectomy and hernia repair.  FAMILY HISTORY: family history includes Mental Illness in his  "maternal grandmother and mother; Substance Abuse in his daughter, son, and another family member; Suicide in his daughter and son.  SOCIAL HISTORY:  reports that he has quit smoking. He does not have any smokeless tobacco history on file. He reports current alcohol use. He reports that he does not use drugs.    Post Discharge Medication Reconciliation Status: discharge medications reconciled and changed, per note/orders  Current Outpatient Medications   Medication Sig Dispense Refill     acetaminophen (TYLENOL) 325 MG tablet Take 650 mg by mouth every 4 hours as needed for pain       calcium carbonate (TUMS) 500 MG chewable tablet Take 2 chew tab by mouth 3 times daily as needed       furosemide (LASIX) 40 MG tablet Take 40 mg by mouth 2 times daily       ipratropium-albuterol (COMBIVENT RESPIMAT)  MCG/ACT inhaler Inhale 1 puff into the lungs 4 times daily as needed for shortness of breath / dyspnea or wheezing       lactulose (CHRONULAC) 10 GM/15ML solution Take 20 g by mouth 3 times daily       omeprazole (PRILOSEC) 20 MG DR capsule Take 1 capsule (20 mg) by mouth daily       oxyCODONE (ROXICODONE) 5 MG tablet Take 5 mg by mouth every 4 hours as needed for pain       umeclidinium (INCRUSE ELLIPTA) 62.5 MCG/INH inhaler Inhale 1 puff into the lungs daily       ROS:  10 point ROS of systems including Constitutional, Eyes, Respiratory, Cardiovascular, Gastroenterology, Genitourinary, Integumentary, Musculoskeletal, Psychiatric were all negative except for pertinent positives noted in my HPI.    Vitals:  /69   Pulse 99   Temp 98.7  F (37.1  C)   Resp 24   Ht 1.702 m (5' 7\")   Wt 117.9 kg (260 lb)   SpO2 96%   BMI 40.72 kg/m    Exam:  GENERAL APPEARANCE:  in no distress, cooperative  ENT:  Mouth and posterior oropharynx normal, moist mucous membranes, oral mucosa moist, no lesion noted.   EYES:  EOMI, Pupil rounded and equal.  RESP:  Coarse sounds.   CV:  S1S2 audible, regular HR, no murmur " appreciated.   ABDOMEN: extremely distended, fluid thrill positive. BS audible but diminished.   M/S: pitting edema up to mid back. SKIN:  No rash.   NEURO:   No NFD appreciated on observation. Hand  5/5 b/l  PSYCH:  affect and mood normal      Lab/Diagnostic data: Reviewed in the chart and EHR.        ASSESSMENT/PLAN:  Type 2 diabetes mellitus without complication, without long-term current use of insulin (H)  (primary encounter diagnosis)  Comment: goal is comfort care, symptomatic management. Permit hyperglycemia as long as pt is asymptotic.       Chronic obstructive pulmonary disease, unspecified COPD type (H)  Comment: compensated. Still in Incruse Ellipta. This is an expensive medicine. Alternative is ipratropium neb tid scheduled and bid prn.     Paroxysmal atrial fibrillation (H)  Coronary artery disease involving native coronary artery of native heart with angina pectoris (H)      Hospice care patient  Cirrhosis of liver without ascites (H)  Anasarca  - extremely distended abdomen, scheduled for paracentesis. Unsure why permanent catheter was not placed.   - Appreciate collaboration with  hospice team for symptom management in collaboration with cares for maximum comfort at end-of-life  - shared with the patient the MV is not needed at this stage, and not recommended, but still want to buy it from OTC. Ok for that.   - regarding life expectancy, shared with him that the expectation is up to six might, however, could live less or more depends on the general decline.       Electronically signed by:  Tali Choi MD

## 2022-09-01 NOTE — PROGRESS NOTES
[facility]  Episodic/Acute/Follow-Up  [place] MRN: [id number]. Place of Service where encounter took place:  [facility] () [[id number]]   Chief Complaint   Patient presents with     RECHECK    HPI: Fadi Fernando Sr.  is a 67 year old (1955), who is being seen today for an episodic care visit.  HPI information obtained from: facility chart records, facility staff, patient report and [facility] chart review. Today's concern is:    Fadi seen today on follow-up after his admission to long-term care and after nursing reported a fall a few days ago.  Fadi recalls this fall, saying that he face planted behind the entry door.  He shows me his bruise.  Given this, he promptly denies headaches, dizziness, vision changes.  He says it was just an hoops.  He does not ever remember passing out or losing consciousness.  He denies any other injury.  His blood pressures have been good, and he is happy about this.  His main concern is obtaining a paracentesis in early September.  He denies shortness of breath, and his inhaler was restarted after it was found in his room by nursing without an order.  This was likely discontinued by hospice.  His swelling is the same, but its not bothering him.  His appetite is fair, and he denies nausea or heartburn.  He is having 3+ bowel movements per day.    Past Medical and Surgical History reviewed in Epic today.  MEDICATIONS:  Current Outpatient Medications   Medication Sig Dispense Refill     umeclidinium (INCRUSE ELLIPTA) 62.5 MCG/INH inhaler Inhale 1 puff into the lungs daily       acetaminophen (TYLENOL) 325 MG tablet Take 650 mg by mouth every 4 hours as needed for pain       calcium carbonate (TUMS) 500 MG chewable tablet Take 2 chew tab by mouth 3 times daily as needed       furosemide (LASIX) 40 MG tablet Take 40 mg by mouth 2 times daily       ipratropium-albuterol (COMBIVENT RESPIMAT)  MCG/ACT inhaler Inhale 1 puff into the lungs 4 times  "daily as needed for shortness of breath / dyspnea or wheezing       lactulose (CHRONULAC) 10 GM/15ML solution Take 20 g by mouth 3 times daily       omeprazole (PRILOSEC) 20 MG DR capsule Take 1 capsule (20 mg) by mouth daily       oxyCODONE (ROXICODONE) 5 MG tablet Take 5 mg by mouth every 4 hours as needed for pain       REVIEW OF SYSTEMS: Limited secondary to cognitive impairment but today pt reports the above and 4 point ROS including Respiratory, CV, GI and , other than that noted in the HPI, is negative.    Objective: /75   Pulse 91   Temp 98.8  F (37.1  C)   Resp 18   Ht 1.702 m (5' 7\")   Wt 113.9 kg (251 lb)   SpO2 95%   BMI 39.31 kg/m    Exam:  GENERAL APPEARANCE: Alert, in no distress, cooperative.   ENT: Mouth/posterior oropharynx intact w/ moist mucous membranes, hearing acuity [place].  EYES: EOM, conjunctivae, lids, pupils and irises normal, PERRL2.   RESP: Respiratory effort fair, no respiratory distress, On RA.   CV: Edema 3-4+ BLE. Peripheral pulses are 2+.  ABDOMEN: Normal bowel sounds, soft, large, rounded non-tender abdomen, and no masses palpated.  SKIN: Inspection/Palpation of skin and subcutaneous tissue baseline w/ fragility. No wounds/rashes noted.   NEURO: CN II-XII at patient's baseline, sensation baseline PPS.  PSYCH: Insight, judgement, and memory are forgetful at baseline, affect and mood are happy/engaged.    Labs: Labs done in facility are in EPIC. Please refer to them using [employer]/Care Everywhere.    ASSESSMENT/PLAN:  Alcoholic cirrhosis of liver without ascites (H)  Anasarca  History of alcohol abuse  Chronic obstructive pulmonary disease, unspecified COPD type (H)  Falls, initial encounter  Acute on chronic. Ongoing.    No acute injury from fall, and given patient is on hospice he does not qualify for PT/OT.    Fadi would like to remain on hospice, but still pursue periodic paracentesis.    Provider coordinated care with nursing, and clarified inhaler orders.    Nursing " and [facility] working to schedule paracentesis.  Follow up routinely or as needed.    Orders:  No new orders.    Electronically signed by:  Dr. Briana Jiang, NANCY YOUNG DNP

## 2022-09-05 NOTE — LETTER
9/5/2022        RE: Fadi Fernando Sr.  [address] [place] [address] [place]  [address]  [facility] [address]        [facility] GERIATRICS  Primary Care Provider & Clinic: [facility], [address] [facility] [address]  Chief Complaint   Patient presents with     [facility] F/U     [place] 8/7/2022 - 8/19/2022     [place] Medical Record Number: [id number]  Place of Service Where Encounter Took Place: [facility] ([facility]) [[id number]]    Fadi Fernando Sr. is a 67 year old (1955), admitted to the above facility from  [facility]. Hospital stay 8/7/22 through 8/19/22. Admitted to this facility for rehab, medical management, nursing care and hospice.    HPI:    HPI information obtained from: facility chart records, facility staff, patient report and [facility] chart review.   Brief Summary of Hospital Course:   Pt with PMH of Alcoholic cirrhosis, hospitalized with anasarca, ALMA DELIA with [facility] (not a candidate for HD). Due to poor prognosis endorsed into hospice care      Today  -Patient was seen and examined today.  - RN reported patient scheduled for paracentesis tomorrow.  Patient's reported abdominal pain and reported so distended now.  Report that the last time he had it was last month ago and about 10 L were drained.  Denies nausea or vomiting.  Reports appetite okay.  Shared that he would like to play for 1 year. Endorses edema still there  - Reported to have tremor which is a new patient endorses at least another tremor but not interfering with meal  Patient Sridhar CALLOWAY to use vitamins over-the-counter-     Code Status/Advanced Directives Discussion: DNR only  Patient's Living Condition: lives in a skilled nursing facility  ALLERGIES: Clonidine, Ibuprofen, Lisinopril, Motrin [ibuprofen micronized], Niacin, and Niacin  PAST MEDICAL HISTORY:  has a past medical history of Coronary artery disease and Hypertension.  PAST SURGICAL HISTORY:  has a past surgical history that  "includes appendectomy and hernia repair.  FAMILY HISTORY: family history includes Mental Illness in his maternal grandmother and mother; Substance Abuse in his daughter, son, and another family member; Suicide in his daughter and son.  SOCIAL HISTORY:  reports that he has quit smoking. He does not have any smokeless tobacco history on file. He reports current alcohol use. He reports that he does not use drugs.    Post Discharge Medication Reconciliation Status: discharge medications reconciled and changed, per note/orders  Current Outpatient Medications   Medication Sig Dispense Refill     acetaminophen (TYLENOL) 325 MG tablet Take 650 mg by mouth every 4 hours as needed for pain       calcium carbonate (TUMS) 500 MG chewable tablet Take 2 chew tab by mouth 3 times daily as needed       furosemide (LASIX) 40 MG tablet Take 40 mg by mouth 2 times daily       ipratropium-albuterol (COMBIVENT RESPIMAT)  MCG/ACT inhaler Inhale 1 puff into the lungs 4 times daily as needed for shortness of breath / dyspnea or wheezing       lactulose (CHRONULAC) 10 GM/15ML solution Take 20 g by mouth 3 times daily       omeprazole (PRILOSEC) 20 MG DR capsule Take 1 capsule (20 mg) by mouth daily       oxyCODONE (ROXICODONE) 5 MG tablet Take 5 mg by mouth every 4 hours as needed for pain       umeclidinium (INCRUSE ELLIPTA) 62.5 MCG/INH inhaler Inhale 1 puff into the lungs daily       ROS:  10 point ROS of systems including Constitutional, Eyes, Respiratory, Cardiovascular, Gastroenterology, Genitourinary, Integumentary, Musculoskeletal, Psychiatric were all negative except for pertinent positives noted in my HPI.    Vitals:  /69   Pulse 99   Temp 98.7  F (37.1  C)   Resp 24   Ht 1.702 m (5' 7\")   Wt 117.9 kg (260 lb)   SpO2 96%   BMI 40.72 kg/m    Exam:  GENERAL APPEARANCE:  in no distress, cooperative  ENT:  Mouth and posterior oropharynx normal, moist mucous membranes, oral mucosa moist, no lesion noted.   EYES:  " EOMI, Pupil rounded and equal.  RESP:  Coarse sounds.   CV:  S1S2 audible, regular HR, no murmur appreciated.   ABDOMEN: extremely distended, fluid thrill positive. BS audible but diminished.   M/S: pitting edema up to mid back. SKIN:  No rash.   NEURO:   No NFD appreciated on observation. Hand  5/5 b/l  PSYCH:  affect and mood normal      Lab/Diagnostic data: Reviewed in the chart and EHR.        ASSESSMENT/PLAN:  Type 2 diabetes mellitus without complication, without long-term current use of insulin (H)  (primary encounter diagnosis)  Comment: goal is comfort care, symptomatic management. Permit hyperglycemia as long as pt is asymptotic.       Chronic obstructive pulmonary disease, unspecified COPD type (H)  Comment: compensated. Still in Incruse Ellipta. This is an expensive medicine. Alternative is ipratropium neb tid scheduled and bid prn.     Paroxysmal atrial fibrillation (H)  Coronary artery disease involving native coronary artery of native heart with angina pectoris (H)      Hospice care patient  Cirrhosis of liver without ascites (H)  Anasarca  - extremely distended abdomen, scheduled for paracentesis. Unsure why permanent catheter was not placed.   - Appreciate collaboration with  hospice team for symptom management in collaboration with cares for maximum comfort at end-of-life  - shared with the patient the MV is not needed at this stage, and not recommended, but still want to buy it from [facility]. Ok for that.   - regarding life expectancy, shared with him that the expectation is up to six might, however, could live less or more depends on the general decline.       Electronically signed by:  Tali Choi MD         Sincerely,        Tali Choi MD